# Patient Record
Sex: FEMALE | Race: WHITE | NOT HISPANIC OR LATINO | Employment: FULL TIME | URBAN - METROPOLITAN AREA
[De-identification: names, ages, dates, MRNs, and addresses within clinical notes are randomized per-mention and may not be internally consistent; named-entity substitution may affect disease eponyms.]

---

## 2017-01-03 ENCOUNTER — TELEPHONE (OUTPATIENT)
Dept: DERMATOLOGY | Facility: CLINIC | Age: 67
End: 2017-01-03

## 2017-01-03 NOTE — TELEPHONE ENCOUNTER
----- Message from Kimberly Krueger sent at 1/3/2017  8:59 AM CST -----  Please call patient in regards to a missed call [phone]

## 2017-01-03 NOTE — TELEPHONE ENCOUNTER
I spoke with patient this afternoon. She declines Mohs surgery with . I offered pt a consult with  she declines having Mohs surgery done. She will contact her PCP about having her cut it out for her

## 2017-01-17 ENCOUNTER — OFFICE VISIT (OUTPATIENT)
Dept: RHEUMATOLOGY | Facility: CLINIC | Age: 67
End: 2017-01-17
Payer: COMMERCIAL

## 2017-01-17 VITALS
BODY MASS INDEX: 32.13 KG/M2 | HEART RATE: 55 BPM | DIASTOLIC BLOOD PRESSURE: 89 MMHG | SYSTOLIC BLOOD PRESSURE: 179 MMHG | WEIGHT: 199.06 LBS

## 2017-01-17 DIAGNOSIS — M15.9 PRIMARY OSTEOARTHRITIS INVOLVING MULTIPLE JOINTS: ICD-10-CM

## 2017-01-17 DIAGNOSIS — I10 ESSENTIAL HYPERTENSION: ICD-10-CM

## 2017-01-17 DIAGNOSIS — L40.50 PSORIATIC ARTHRITIS: Primary | ICD-10-CM

## 2017-01-17 DIAGNOSIS — M79.7 FIBROMYALGIA: ICD-10-CM

## 2017-01-17 DIAGNOSIS — M81.0 OSTEOPOROSIS: ICD-10-CM

## 2017-01-17 PROCEDURE — 3079F DIAST BP 80-89 MM HG: CPT | Mod: S$GLB,,, | Performed by: INTERNAL MEDICINE

## 2017-01-17 PROCEDURE — 1159F MED LIST DOCD IN RCRD: CPT | Mod: S$GLB,,, | Performed by: INTERNAL MEDICINE

## 2017-01-17 PROCEDURE — 99215 OFFICE O/P EST HI 40 MIN: CPT | Mod: 25,S$GLB,, | Performed by: INTERNAL MEDICINE

## 2017-01-17 PROCEDURE — 99999 PR PBB SHADOW E&M-EST. PATIENT-LVL III: CPT | Mod: PBBFAC,,, | Performed by: INTERNAL MEDICINE

## 2017-01-17 PROCEDURE — 3077F SYST BP >= 140 MM HG: CPT | Mod: S$GLB,,, | Performed by: INTERNAL MEDICINE

## 2017-01-17 PROCEDURE — 96372 THER/PROPH/DIAG INJ SC/IM: CPT | Mod: S$GLB,,, | Performed by: INTERNAL MEDICINE

## 2017-01-17 RX ORDER — CLONIDINE HYDROCHLORIDE 0.1 MG/1
0.1 TABLET ORAL 2 TIMES DAILY
Qty: 30 TABLET | Refills: 0 | Status: SHIPPED | OUTPATIENT
Start: 2017-01-17 | End: 2017-06-07

## 2017-01-17 RX ORDER — ACETAMINOPHEN AND CODEINE PHOSPHATE 300; 30 MG/1; MG/1
TABLET ORAL
Qty: 120 TABLET | Refills: 3 | Status: SHIPPED | OUTPATIENT
Start: 2017-01-17 | End: 2017-06-07 | Stop reason: SDUPTHER

## 2017-01-17 RX ORDER — KETOROLAC TROMETHAMINE 30 MG/ML
60 INJECTION, SOLUTION INTRAMUSCULAR; INTRAVENOUS
Status: COMPLETED | OUTPATIENT
Start: 2017-01-17 | End: 2017-01-17

## 2017-01-17 RX ORDER — CYANOCOBALAMIN 1000 UG/ML
1000 INJECTION, SOLUTION INTRAMUSCULAR; SUBCUTANEOUS ONCE
Status: COMPLETED | OUTPATIENT
Start: 2017-01-17 | End: 2017-01-17

## 2017-01-17 RX ORDER — METHYLPREDNISOLONE ACETATE 80 MG/ML
160 INJECTION, SUSPENSION INTRA-ARTICULAR; INTRALESIONAL; INTRAMUSCULAR; SOFT TISSUE
Status: COMPLETED | OUTPATIENT
Start: 2017-01-17 | End: 2017-01-17

## 2017-01-17 RX ADMIN — CYANOCOBALAMIN 1000 MCG: 1000 INJECTION, SOLUTION INTRAMUSCULAR; SUBCUTANEOUS at 10:01

## 2017-01-17 RX ADMIN — METHYLPREDNISOLONE ACETATE 160 MG: 80 INJECTION, SUSPENSION INTRA-ARTICULAR; INTRALESIONAL; INTRAMUSCULAR; SOFT TISSUE at 10:01

## 2017-01-17 RX ADMIN — KETOROLAC TROMETHAMINE 60 MG: 30 INJECTION, SOLUTION INTRAMUSCULAR; INTRAVENOUS at 10:01

## 2017-01-17 NOTE — LETTER
January 17, 2017    Mary Reyes  [address] [address]             [place] [facility]  [address] [place] [address]  Phone: [phone]  Fax: [phone] Dear Sirs          Mrs Mary Reyes has the diagnosis of Rheumatoid arthritis and osteoarthritis,which causes pain stiffness and joint deformaties in her upper and lower extremities, cervical and lumbar spine proximal muscles and legs. She is unable to walk longer than one block without rest, unable to sit ,stand, bend, kneel. She is unable to lift more than five lbs. In my opinion which includes physical exams, xrays and laboratory data, due to the severity of her disease and the quality of her life as well as the inability to control her disease state I recommend that she continues her long term disability   If you have any questions or concerns, please don't hesitate to call.    Sincerely,        Ori Asher MD

## 2017-01-17 NOTE — PROGRESS NOTES
Subjective:       Patient ID: Mary Reyes is a 66 y.o. female.    Chief Complaint: Follow-up    HPI Comments: Follow up :PSA on MTX, she has a basal cell ca on back. She had elevated B/P due to white coat syndrome.Patient complains of arthralgias and myalgias for which has been present for a few years. Pain is located in multiple joints, both shoulder(s), both elbow(s), both wrist(s), both MCP(s): 1st, 2nd, 3rd, 4th and 5th, both PIP(s): 1st, 2nd, 3rd, 4th and 5th, both DIP(s): 1st and 2nd, both hip(s), both knee(s) and both MTP(s): 1st, 2nd, 3rd, 4th and 5th, is described as aching, pulsating, shooting and throbbing, and is constant, moderate .  Associated symptoms include: crepitation, decreased range of motion, edema, effusion, tenderness and warmth.                The disease course has been worsening. Compliance with total regimen is %.     She complains of joint swelling. Associated symptoms include fatigue.     Past treatments include methotrexate. The treatment provided moderate relief. Compliance with prior treatments has been good.     Review of Systems   Constitutional: Positive for activity change and fatigue. Negative for appetite change, chills, diaphoresis and unexpected weight change.   HENT: Negative for congestion, dental problem, ear discharge, ear pain, facial swelling, mouth sores, nosebleeds, postnasal drip, rhinorrhea, sinus pressure, sneezing, sore throat, tinnitus and voice change.    Eyes: Negative for photophobia, pain, discharge, redness and itching.   Respiratory: Negative for apnea, cough, chest tightness, shortness of breath and wheezing.    Cardiovascular: Positive for leg swelling. Negative for chest pain and palpitations.   Gastrointestinal: Negative for abdominal distention, abdominal pain, constipation, diarrhea, nausea and vomiting.   Endocrine: Negative for cold intolerance, heat intolerance, polydipsia and polyuria.   Genitourinary: Negative for decreased urine volume,  difficulty urinating, flank pain, frequency, hematuria and urgency.   Musculoskeletal: Positive for arthralgias, back pain, joint swelling, neck pain and neck stiffness. Negative for gait problem.   Skin: Positive for rash. Negative for pallor and wound.   Allergic/Immunologic: Positive for immunocompromised state.   Neurological: Positive for numbness. Negative for dizziness, tremors and weakness.   Hematological: Negative for adenopathy. Bruises/bleeds easily.   Psychiatric/Behavioral: Positive for sleep disturbance. The patient is not nervous/anxious.          Objective:     Visit Vitals    BP (!) 179/89 (BP Location: Right arm, Patient Position: Sitting, BP Method: Automatic)    Pulse (!) 55    Wt 90.3 kg (199 lb 1.2 oz)    BMI 32.13 kg/m2        Physical Exam   Nursing note and vitals reviewed.  Constitutional: She is oriented to person, place, and time and well-developed, well-nourished, and in no distress. She appears distressed.   HENT:   Head: Normocephalic and atraumatic.   Mouth/Throat: Oropharynx is clear and moist.   Eyes: EOM are normal. Pupils are equal, round, and reactive to light.   Neck: Neck supple. No thyromegaly present.   Cardiovascular: Normal rate, regular rhythm and normal heart sounds.  Exam reveals no gallop and no friction rub.    No murmur heard.  Pulmonary/Chest: Breath sounds normal. She has no wheezes. She has no rales. She exhibits no tenderness.   Abdominal: There is no tenderness. There is no rebound and no guarding.       Right Side Rheumatological Exam     Examination finds the 1st MCP, 2nd MCP, 3rd MCP, 4th MCP and 5th MCP normal.    The patient is tender to palpation of the shoulder, elbow, wrist, knee, 1st PIP, 1st MCP, 2nd PIP, 2nd MCP, 3rd PIP, 3rd MCP, 4th PIP, 4th MCP, 5th PIP and 5th MCP    She has swelling of the shoulder, elbow, knee, 1st PIP, 1st MCP, 2nd PIP, 2nd MCP, 3rd PIP, 3rd MCP, 4th PIP, 4th MCP, 5th PIP and 5th MCP    The patient has an enlarged wrist,  1st PIP, 2nd PIP, 3rd PIP, 4th PIP and 5th PIP    Shoulder Exam   Tenderness Location: acromion    Range of Motion   Active Abduction: abnormal   Adduction: abnormal  Sensation: decreased    Knee Exam   Tenderness Location: medial joint line and lateral joint line  Patellofemoral Crepitus: positive  Effusion: positive  Warmth: positive  Sensation: decreased    Hip Exam   Tenderness Location: posterior    Range of Motion   Extension: abnormal   Flexion: abnormal   Abduction: abnormal   Adduction: abnormal   Sensation: decreased    Elbow/Wrist Exam   Tenderness Location: no tenderness  Sensation: normal    Foot Exam   Right foot exam exhibits signs of inflamed dorsum  Right foot exam exhibits signs of no podagra, no tophus and no plantar fasciitis  Ankle Swelling: positive  Ankle Crepitus: positive  Foot Swelling: postitive  Foot Crepitus: positive    Muscle Strength (0-5 scale):  : 4/5     Left Side Rheumatological Exam     Examination finds the knee, 1st MCP, 2nd MCP, 3rd MCP, 4th MCP and 5th MCP normal.    The patient is tender to palpation of the shoulder, elbow, wrist, knee, 1st PIP, 1st MCP, 2nd PIP, 2nd MCP, 3rd PIP, 3rd MCP, 4th PIP, 4th MCP, 5th PIP and 5th MCP.    She has swelling of the shoulder, elbow, 1st PIP, 1st MCP, 2nd PIP, 2nd MCP, 3rd PIP, 3rd MCP, 4th PIP, 4th MCP, 5th PIP and 5th MCP    The patient has an enlarged shoulder, wrist, 1st PIP, 2nd PIP, 3rd PIP, 4th PIP and 5th PIP.    Shoulder Exam   Tenderness Location: acromion    Range of Motion   Active Abduction: abnormal   Sensation: decreased    Knee Exam   Tenderness Location: lateral joint line and medial joint line    Patellofemoral Crepitus: positive  Effusion: positive  Warmth: positive  Sensation: decreased    Hip Exam   Tenderness Location: posterior    Range of Motion   Extension: abnormal   Flexion: abnormal   Abduction: abnormal   Adduction: abnormal   Sensation: decreased    Elbow/Wrist Exam   Tenderness Location: lateral  epicondyle and medial epicondyle  Sensation: normal    Foot Exam   Left foot exam exhibits signs of inflamed dorsum  Left foot exam exhibits signs of no podagra and no plantar fasciitis  Ankle Swelling: positive  Foot Swelling: positive  Foot Crepitus: positive    Muscle Strength (0-5 scale):  :  4/5   Quadriceps:  2       Back/Neck Exam   General Inspection   Gait: L knee brace.       Tenderness Right paramedian tenderness of the Lower C-Spine, Lower L-Spine and SI Joint.Left paramedian tenderness of the Upper C-Spine, Lower L-Spine and SI Joint.      Comments:  15 out of 18 tender points    Lymphadenopathy:     She has no cervical adenopathy.   Neurological: She is alert and oriented to person, place, and time. Gait normal.   Reflex Scores:       Patellar reflexes are 3+ on the right side and 3+ on the left side.  Skin: Rash noted. No erythema. No pallor.     B feet, knee knees , elbows   Psychiatric: Mood and affect normal.   Musculoskeletal: She exhibits edema and tenderness.              Assessment:         Encounter Diagnoses   Name Primary?    Psoriatic arthritis Yes    Essential hypertension     Primary osteoarthritis involving multiple joints     Fibromyalgia     Osteoporosis          Plan:     Psoriatic arthritis  -     cyanocobalamin injection 1,000 mcg; Inject 1 mL (1,000 mcg total) into the muscle once.  -     ketorolac injection 60 mg; Inject 2 mLs (60 mg total) into the muscle one time.  -     methylPREDNISolone acetate injection 160 mg; Inject 2 mLs (160 mg total) into the muscle one time.  -     cloNIDine (CATAPRES) 0.1 MG tablet; Take 1 tablet (0.1 mg total) by mouth 2 (two) times daily.  Dispense: 30 tablet; Refill: 0  -     Comprehensive metabolic panel; Future; Expected date: 1/17/17  -     CBC auto differential; Future; Expected date: 1/17/17  -     C-reactive protein; Future; Expected date: 1/17/17  -     Sedimentation rate, manual; Future; Expected date: 1/17/17  -      acetaminophen-codeine 300-30mg (TYLENOL #3) 300-30 mg Tab; 1 tab po q 6 hrs prn pain , ra flare, psoriatic joint pain  Dispense: 120 tablet; Refill: 3    Essential hypertension  -     cyanocobalamin injection 1,000 mcg; Inject 1 mL (1,000 mcg total) into the muscle once.  -     ketorolac injection 60 mg; Inject 2 mLs (60 mg total) into the muscle one time.  -     methylPREDNISolone acetate injection 160 mg; Inject 2 mLs (160 mg total) into the muscle one time.  -     cloNIDine (CATAPRES) 0.1 MG tablet; Take 1 tablet (0.1 mg total) by mouth 2 (two) times daily.  Dispense: 30 tablet; Refill: 0  -     Comprehensive metabolic panel; Future; Expected date: 1/17/17  -     CBC auto differential; Future; Expected date: 1/17/17  -     C-reactive protein; Future; Expected date: 1/17/17  -     Sedimentation rate, manual; Future; Expected date: 1/17/17  -     acetaminophen-codeine 300-30mg (TYLENOL #3) 300-30 mg Tab; 1 tab po q 6 hrs prn pain , ra flare, psoriatic joint pain  Dispense: 120 tablet; Refill: 3    Primary osteoarthritis involving multiple joints  -     acetaminophen-codeine 300-30mg (TYLENOL #3) 300-30 mg Tab; 1 tab po q 6 hrs prn pain , ra flare, psoriatic joint pain  Dispense: 120 tablet; Refill: 3    Fibromyalgia  -     acetaminophen-codeine 300-30mg (TYLENOL #3) 300-30 mg Tab; 1 tab po q 6 hrs prn pain , ra flare, psoriatic joint pain  Dispense: 120 tablet; Refill: 3    Osteoporosis  -     acetaminophen-codeine 300-30mg (TYLENOL #3) 300-30 mg Tab; 1 tab po q 6 hrs prn pain , ra flare, psoriatic joint pain  Dispense: 120 tablet; Refill: 3

## 2017-01-17 NOTE — PROGRESS NOTES
Administered 1 cc ( 1000 mcg/ml ) of b 12 to the right upper outer gluteal. Informed of s/s to report verbalized understanding. No adverse reactions noted.    Lot # [id number]  Expiration may 18    Administered 2 cc ( 80 mg/ml ) of depomedrol to the right upper outer gluteal. Informed of s/s to report verbalized understanding. No adverse reactions noted.    Lot # [id number]  Expiration 10/2017    Administered 2 cc ( 30 mg/ml ) of toradol to the left upper outer gluteal. Informed of s/s to report verbalized understanding. No adverse reactions noted.    Lot # [id number]  Expiration 1 may 2018

## 2017-01-17 NOTE — MR AVS SNAPSHOT
[place] [facility]  [address]  [facility] [address]  Phone: [phone]  Fax: [phone]                  Mary Reyes   2017 9:00 AM   Office Visit    Description:  Female : 1950   Provider:  Ori Asher MD   Department:  [place] [facility]           Reason for Visit     Follow-up           Diagnoses this Visit        Comments    Psoriatic arthritis    -  Primary     Essential hypertension         Primary osteoarthritis involving multiple joints         Fibromyalgia         Osteoporosis                To Do List           Goals (5 Years of Data)     None      Follow-Up and Disposition     Return in about 4 months (around 2017).       These Medications        Disp Refills Start End    cloNIDine (CATAPRES) 0.1 MG tablet 30 tablet 0 2017    Take 1 tablet (0.1 mg total) by mouth 2 (two) times daily. - Oral    Pharmacy: [facility] [address] [place] [address] AT [facility] Ph #: [phone]       acetaminophen-codeine 300-30mg (TYLENOL #3) 300-30 mg Tab 120 tablet 3 2017     1 tab po q 6 hrs prn pain , ra flare, psoriatic joint pain    Pharmacy: [facility] [address] [place] [address] AT [facility] Ph #: [phone]         [facility] On Call     [facility] On Call Nurse Care Line -  Assistance  Registered nurses in the [facility] provide clinical advisement, health education, appointment booking, and other advisory services.  Call for this free service at [phone].             Medications           Message regarding Medications     Verify the changes and/or additions to your medication regime listed below are the same as discussed with your clinician today.  If any of these changes or additions are incorrect, please notify your healthcare provider.        START taking these NEW medications        Refills    cloNIDine (CATAPRES)  0.1 MG tablet 0    Sig: Take 1 tablet (0.1 mg total) by mouth 2 (two) times daily.    Class: Normal    Route: Oral      These medications were administered today        Dose Freq    cyanocobalamin injection 1,000 mcg 1,000 mcg Once    Sig: Inject 1 mL (1,000 mcg total) into the muscle once.    Class: Normal    Route: Intramuscular    ketorolac injection 60 mg 60 mg Clinic/HOD 1 time    Sig: Inject 2 mLs (60 mg total) into the muscle one time.    Class: Normal    Route: Intramuscular    methylPREDNISolone acetate injection 160 mg 160 mg Clinic/HOD 1 time    Sig: Inject 2 mLs (160 mg total) into the muscle one time.    Class: Normal    Route: Intramuscular           Verify that the below list of medications is an accurate representation of the medications you are currently taking.  If none reported, the list may be blank. If incorrect, please contact your healthcare provider. Carry this list with you in case of emergency.           Current Medications     acetaminophen-codeine 300-30mg (TYLENOL #3) 300-30 mg Tab 1 tab po q 6 hrs prn pain , ra flare, psoriatic joint pain    amitriptyline (ELAVIL) 10 MG tablet Take 2 tablets (20 mg total) by mouth nightly as needed for Insomnia.    aspirin 81 MG Chew Take 81 mg by mouth once daily.    atenolol (TENORMIN) 50 MG tablet Take 1 tablet (50 mg total) by mouth 2 (two) times daily.    clobetasol 0.05% (TEMOVATE) 0.05 % Oint Apply topically 2 (two) times daily.    econazole nitrate 1 % cream Apply to foot twice daily x 1 month    ergocalciferol (ERGOCALCIFEROL) 50,000 unit Cap Take 1 capsule (50,000 Units total) by mouth every 7 days.    fluconazole (DIFLUCAN) 200 MG Tab 1 po qweek x 2 months    fluorouracil (EFUDEX) 5 % cream AAA nose twice daily x 2 weeks, stop if severe irritation develops    folic acid (FOLVITE) 1 MG tablet Take 1 tablet (1,000 mcg total) by mouth once daily.    furosemide (LASIX) 20 MG tablet Take 1 tablet (20 mg total) by mouth once daily.    ibuprofen  (ADVIL,MOTRIN) 400 MG tablet Take 400 mg by mouth every 6 (six) hours as needed for Other.    methotrexate 2.5 MG Tab Take 8 tablets (20 mg total) by mouth every 7 days.    omeprazole (PRILOSEC) 40 MG capsule Take 1 capsule (40 mg total) by mouth once daily.    predniSONE (DELTASONE) 5 MG tablet 3 tabs po qam prn flare    PRENATAL VIT/IRON FUMARATE/FA (PRENATAL 1+1 ORAL) Take 1 tablet by mouth.    simvastatin (ZOCOR) 40 MG tablet Take 1 tablet (40 mg total) by mouth every evening.    cloNIDine (CATAPRES) 0.1 MG tablet Take 1 tablet (0.1 mg total) by mouth 2 (two) times daily.           Clinical Reference Information           Vital Signs - Last Recorded  Most recent update: 1/17/2017  9:19 AM by Trixie Romano LPN    BP Pulse Wt BMI       (!) 179/89 (BP Location: Right arm, Patient Position: Sitting, BP Method: Automatic) (!) 55 90.3 kg (199 lb 1.2 oz) 32.13 kg/m2       Blood Pressure          Most Recent Value    BP  (!)  179/89      Allergies as of 1/17/2017     Effexor [Venlafaxine]      Immunizations Administered on Date of Encounter - 1/17/2017     None      Orders Placed During Today's Visit      Normal Orders This Visit    C-reactive protein     CBC auto differential     Comprehensive metabolic panel     Sedimentation rate, manual     Future Labs/Procedures Expected by Expires    C-reactive protein  1/17/2017 3/18/2018    CBC auto differential  1/17/2017 3/18/2018    Comprehensive metabolic panel  1/17/2017 3/18/2018    Sedimentation rate, manual  1/17/2017 3/18/2018      Administrations This Visit     cyanocobalamin injection 1,000 mcg     Admin Date Action Dose Route Administered By             01/17/2017 Given 1000 mcg Intramuscular Trixie Romano LPN                    ketorolac injection 60 mg     Admin Date Action Dose Route Administered By             01/17/2017 Given 60 mg Intramuscular Triixe Romano LPN                    methylPREDNISolone acetate injection 160 mg     Admin Date  Action Dose Route Administered By             01/17/2017 Given 160 mg Intramuscular Trixie Romano LPN

## 2017-02-16 ENCOUNTER — PATIENT MESSAGE (OUTPATIENT)
Dept: RHEUMATOLOGY | Facility: CLINIC | Age: 67
End: 2017-02-16

## 2017-02-20 ENCOUNTER — PATIENT MESSAGE (OUTPATIENT)
Dept: RHEUMATOLOGY | Facility: CLINIC | Age: 67
End: 2017-02-20

## 2017-02-20 DIAGNOSIS — Z79.631 METHOTREXATE, LONG TERM, CURRENT USE: ICD-10-CM

## 2017-02-20 DIAGNOSIS — M15.9 PRIMARY OSTEOARTHRITIS INVOLVING MULTIPLE JOINTS: ICD-10-CM

## 2017-02-20 DIAGNOSIS — M79.7 FIBROMYALGIA: ICD-10-CM

## 2017-02-20 RX ORDER — METHOTREXATE 2.5 MG/1
TABLET ORAL
Qty: 96 TABLET | Refills: 0 | Status: SHIPPED | OUTPATIENT
Start: 2017-02-20 | End: 2017-06-07 | Stop reason: SDUPTHER

## 2017-02-27 ENCOUNTER — PATIENT MESSAGE (OUTPATIENT)
Dept: RHEUMATOLOGY | Facility: CLINIC | Age: 67
End: 2017-02-27

## 2017-03-03 ENCOUNTER — PATIENT MESSAGE (OUTPATIENT)
Dept: RHEUMATOLOGY | Facility: CLINIC | Age: 67
End: 2017-03-03

## 2017-03-08 ENCOUNTER — TELEPHONE (OUTPATIENT)
Dept: RHEUMATOLOGY | Facility: CLINIC | Age: 67
End: 2017-03-08

## 2017-03-08 NOTE — TELEPHONE ENCOUNTER
Returned patient's call. No answer. lvm for patient to return clinic's call regarding jury duty letter.

## 2017-03-09 ENCOUNTER — PATIENT MESSAGE (OUTPATIENT)
Dept: RHEUMATOLOGY | Facility: CLINIC | Age: 67
End: 2017-03-09

## 2017-05-16 ENCOUNTER — PATIENT MESSAGE (OUTPATIENT)
Dept: DERMATOLOGY | Facility: CLINIC | Age: 67
End: 2017-05-16

## 2017-06-07 ENCOUNTER — OFFICE VISIT (OUTPATIENT)
Dept: RHEUMATOLOGY | Facility: CLINIC | Age: 67
End: 2017-06-07
Payer: COMMERCIAL

## 2017-06-07 VITALS
BODY MASS INDEX: 32.01 KG/M2 | HEART RATE: 68 BPM | SYSTOLIC BLOOD PRESSURE: 155 MMHG | DIASTOLIC BLOOD PRESSURE: 91 MMHG | WEIGHT: 198.31 LBS

## 2017-06-07 DIAGNOSIS — L40.50 PSORIATIC ARTHRITIS: ICD-10-CM

## 2017-06-07 DIAGNOSIS — M15.9 PRIMARY OSTEOARTHRITIS INVOLVING MULTIPLE JOINTS: ICD-10-CM

## 2017-06-07 DIAGNOSIS — M81.0 OSTEOPOROSIS, UNSPECIFIED OSTEOPOROSIS TYPE, UNSPECIFIED PATHOLOGICAL FRACTURE PRESENCE: ICD-10-CM

## 2017-06-07 DIAGNOSIS — M79.7 FIBROMYALGIA: ICD-10-CM

## 2017-06-07 DIAGNOSIS — Z79.1 NSAID LONG-TERM USE: ICD-10-CM

## 2017-06-07 DIAGNOSIS — I10 ESSENTIAL HYPERTENSION: ICD-10-CM

## 2017-06-07 DIAGNOSIS — Z79.631 METHOTREXATE, LONG TERM, CURRENT USE: ICD-10-CM

## 2017-06-07 PROCEDURE — 99999 PR PBB SHADOW E&M-EST. PATIENT-LVL III: CPT | Mod: PBBFAC,,, | Performed by: INTERNAL MEDICINE

## 2017-06-07 PROCEDURE — 99215 OFFICE O/P EST HI 40 MIN: CPT | Mod: 25,S$GLB,, | Performed by: INTERNAL MEDICINE

## 2017-06-07 PROCEDURE — 96372 THER/PROPH/DIAG INJ SC/IM: CPT | Mod: S$GLB,,, | Performed by: INTERNAL MEDICINE

## 2017-06-07 RX ORDER — KETOROLAC TROMETHAMINE 30 MG/ML
60 INJECTION, SOLUTION INTRAMUSCULAR; INTRAVENOUS
Status: COMPLETED | OUTPATIENT
Start: 2017-06-07 | End: 2017-06-07

## 2017-06-07 RX ORDER — FLUCONAZOLE 200 MG/1
TABLET ORAL
Qty: 4 TABLET | Refills: 1 | Status: SHIPPED | OUTPATIENT
Start: 2017-06-07 | End: 2017-12-05

## 2017-06-07 RX ORDER — PREDNISONE 5 MG/1
TABLET ORAL
Qty: 90 TABLET | Refills: 3 | Status: SHIPPED | OUTPATIENT
Start: 2017-06-07 | End: 2017-11-07 | Stop reason: SDUPTHER

## 2017-06-07 RX ORDER — METHOTREXATE 2.5 MG/1
20 TABLET ORAL
Qty: 96 TABLET | Refills: 1 | Status: SHIPPED | OUTPATIENT
Start: 2017-06-07 | End: 2017-11-01 | Stop reason: SDUPTHER

## 2017-06-07 RX ORDER — ACETAMINOPHEN AND CODEINE PHOSPHATE 300; 30 MG/1; MG/1
TABLET ORAL
Qty: 120 TABLET | Refills: 3 | Status: SHIPPED | OUTPATIENT
Start: 2017-06-07 | End: 2017-11-07 | Stop reason: SDUPTHER

## 2017-06-07 RX ORDER — ATENOLOL 50 MG/1
50 TABLET ORAL 2 TIMES DAILY
Qty: 180 TABLET | Refills: 2 | Status: SHIPPED | OUTPATIENT
Start: 2017-06-07 | End: 2017-11-07 | Stop reason: SDUPTHER

## 2017-06-07 RX ORDER — FOLIC ACID 1 MG/1
1000 TABLET ORAL DAILY
Qty: 90 TABLET | Refills: 3 | Status: SHIPPED | OUTPATIENT
Start: 2017-06-07 | End: 2017-11-07 | Stop reason: SDUPTHER

## 2017-06-07 RX ORDER — ERGOCALCIFEROL 1.25 MG/1
50000 CAPSULE ORAL
Qty: 4 CAPSULE | Refills: 5 | Status: SHIPPED | OUTPATIENT
Start: 2017-06-07 | End: 2018-09-10

## 2017-06-07 RX ORDER — METHYLPREDNISOLONE ACETATE 80 MG/ML
160 INJECTION, SUSPENSION INTRA-ARTICULAR; INTRALESIONAL; INTRAMUSCULAR; SOFT TISSUE
Status: COMPLETED | OUTPATIENT
Start: 2017-06-07 | End: 2017-06-07

## 2017-06-07 RX ORDER — CYANOCOBALAMIN 1000 UG/ML
1000 INJECTION, SOLUTION INTRAMUSCULAR; SUBCUTANEOUS
Status: COMPLETED | OUTPATIENT
Start: 2017-06-07 | End: 2017-06-07

## 2017-06-07 RX ORDER — OMEPRAZOLE 40 MG/1
40 CAPSULE, DELAYED RELEASE ORAL DAILY
Qty: 90 CAPSULE | Refills: 3 | Status: SHIPPED | OUTPATIENT
Start: 2017-06-07 | End: 2017-11-07 | Stop reason: SDUPTHER

## 2017-06-07 RX ADMIN — CYANOCOBALAMIN 1000 MCG: 1000 INJECTION, SOLUTION INTRAMUSCULAR; SUBCUTANEOUS at 12:06

## 2017-06-07 RX ADMIN — KETOROLAC TROMETHAMINE 60 MG: 30 INJECTION, SOLUTION INTRAMUSCULAR; INTRAVENOUS at 12:06

## 2017-06-07 RX ADMIN — METHYLPREDNISOLONE ACETATE 160 MG: 80 INJECTION, SUSPENSION INTRA-ARTICULAR; INTRALESIONAL; INTRAMUSCULAR; SOFT TISSUE at 12:06

## 2017-06-07 ASSESSMENT — ROUTINE ASSESSMENT OF PATIENT INDEX DATA (RAPID3)
PSYCHOLOGICAL DISTRESS SCORE: 3.3
PATIENT GLOBAL ASSESSMENT SCORE: 3
MDHAQ FUNCTION SCORE: 1.6
FATIGUE SCORE: 3
WHEN YOU AWAKENED IN THE MORNING OVER THE LAST WEEK, PLEASE INDICATE THE AMOUNT OF TIME IT TAKES UNTIL YOU ARE AS LIMBER AS YOU WILL BE FOR THE DAY: ONE HOUR AFTER WAKING
PAIN SCORE: 10
TOTAL RAPID3 SCORE: 6.11
AM STIFFNESS SCORE: 1, YES

## 2017-06-07 NOTE — PROGRESS NOTES
Administered 1 cc ( 1000 mcg/ml ) of b12 to the right upper outer gluteal. Informed of s/s to report verbalized understanding. No adverse reactions noted.    Lot # [id number]  Expiration aug 18    Administered 2 cc ( 30 mg/ml ) of toradol to the right upper outer gluteal. Informed of s/s to report verbalized understanding. No adverse reactions noted.    Lot # [id number]  Expiration 1 oct 2018    Administered 2 cc ( 80 mg/ml ) of depomedrol to the right upper outer gluteal. Informed of s/s to report verbalized understanding. No adverse reactions noted.    Lot #[id number]  Expiration 02/2018

## 2017-06-07 NOTE — PROGRESS NOTES
Subjective:       Patient ID: Mary Reyes is a 66 y.o. female.    Chief Complaint: Follow-up    Follow up :PSA on MTX, continue with her methotrexate she had her surgery and healing well no inflammation. Patient complains of arthralgias and myalgias for which has been present for a few years. Pain is located in multiple joints, both shoulder(s), both elbow(s), both wrist(s), both MCP(s): 1st, 2nd, 3rd, 4th and 5th, both PIP(s): 1st, 2nd, 3rd, 4th and 5th, both DIP(s): 1st and 2nd, both hip(s), both knee(s) and both MTP(s): 1st, 2nd, 3rd, 4th and 5th, is described as aching, pulsating, shooting and throbbing, and is constant, moderate .  Associated symptoms include: crepitation, decreased range of motion, edema, effusion, tenderness and warmth.                  The disease course has been worsening. Compliance with total regimen is %.     She complains of joint swelling. Associated symptoms include fatigue.     Past treatments include methotrexate. The treatment provided moderate relief. Compliance with prior treatments has been good.     Review of Systems   Constitutional: Positive for activity change and fatigue. Negative for appetite change, chills, diaphoresis and unexpected weight change.   HENT: Negative for congestion, dental problem, ear discharge, ear pain, facial swelling, mouth sores, nosebleeds, postnasal drip, rhinorrhea, sinus pressure, sneezing, sore throat, tinnitus and voice change.    Eyes: Negative for photophobia, pain, discharge, redness and itching.   Respiratory: Negative for apnea, cough, chest tightness, shortness of breath and wheezing.    Cardiovascular: Positive for leg swelling. Negative for chest pain and palpitations.   Gastrointestinal: Negative for abdominal distention, abdominal pain, constipation, diarrhea, nausea and vomiting.   Endocrine: Negative for cold intolerance, heat intolerance, polydipsia and polyuria.   Genitourinary: Negative for decreased urine volume,  difficulty urinating, flank pain, frequency, hematuria and urgency.   Musculoskeletal: Positive for arthralgias, back pain, joint swelling, neck pain and neck stiffness. Negative for gait problem.   Skin: Positive for rash. Negative for pallor and wound.   Allergic/Immunologic: Positive for immunocompromised state.   Neurological: Positive for numbness. Negative for dizziness, tremors and weakness.   Hematological: Negative for adenopathy. Bruises/bleeds easily.   Psychiatric/Behavioral: Positive for sleep disturbance. The patient is not nervous/anxious.          Objective:     BP (!) 155/91 (BP Location: Right arm, Patient Position: Sitting, BP Method: Automatic)   Pulse 68   Wt 89.9 kg (198 lb 4.8 oz)   BMI 32.01 kg/m²      Physical Exam   Nursing note and vitals reviewed.  Constitutional: She is oriented to person, place, and time and well-developed, well-nourished, and in no distress. She appears distressed.   HENT:   Head: Normocephalic and atraumatic.   Mouth/Throat: Oropharynx is clear and moist.   Eyes: EOM are normal. Pupils are equal, round, and reactive to light.   Neck: Neck supple. No thyromegaly present.   Cardiovascular: Normal rate, regular rhythm and normal heart sounds.  Exam reveals no gallop and no friction rub.    No murmur heard.  Pulmonary/Chest: Breath sounds normal. She has no wheezes. She has no rales. She exhibits no tenderness.   Abdominal: There is no tenderness. There is no rebound and no guarding.       Right Side Rheumatological Exam     Examination finds the 1st MCP, 2nd MCP, 3rd MCP, 4th MCP and 5th MCP normal.    The patient is tender to palpation of the shoulder, elbow, wrist, knee, 1st PIP, 1st MCP, 2nd PIP, 2nd MCP, 3rd PIP, 3rd MCP, 4th PIP, 4th MCP, 5th PIP and 5th MCP    She has swelling of the shoulder, elbow, knee, 1st PIP, 1st MCP, 2nd PIP, 2nd MCP, 3rd PIP, 3rd MCP, 4th PIP, 4th MCP, 5th PIP and 5th MCP    The patient has an enlarged wrist, 1st PIP, 2nd PIP, 3rd PIP,  4th PIP and 5th PIP    Shoulder Exam   Tenderness Location: acromion    Range of Motion   Active Abduction: abnormal   Adduction: abnormal  Sensation: decreased    Knee Exam   Tenderness Location: medial joint line and lateral joint line  Patellofemoral Crepitus: positive  Effusion: positive  Warmth: positive  Sensation: decreased    Hip Exam   Tenderness Location: posterior    Range of Motion   Extension: abnormal   Flexion: abnormal   Abduction: abnormal   Adduction: abnormal   Sensation: decreased    Elbow/Wrist Exam   Tenderness Location: no tenderness  Sensation: normal    Foot Exam   Right foot exam exhibits signs of inflamed dorsum  Right foot exam exhibits signs of no podagra, no tophus and no plantar fasciitis  Ankle Swelling: positive  Ankle Crepitus: positive  Foot Swelling: postitive  Foot Crepitus: positive    Muscle Strength (0-5 scale):  : 4/5     Left Side Rheumatological Exam     Examination finds the knee, 1st MCP, 2nd MCP, 3rd MCP, 4th MCP and 5th MCP normal.    The patient is tender to palpation of the shoulder, elbow, wrist, knee, 1st PIP, 1st MCP, 2nd PIP, 2nd MCP, 3rd PIP, 3rd MCP, 4th PIP, 4th MCP, 5th PIP and 5th MCP.    She has swelling of the shoulder, elbow, 1st PIP, 1st MCP, 2nd PIP, 2nd MCP, 3rd PIP, 3rd MCP, 4th PIP, 4th MCP, 5th PIP and 5th MCP    The patient has an enlarged shoulder, wrist, 1st PIP, 2nd PIP, 3rd PIP, 4th PIP and 5th PIP.    Shoulder Exam   Tenderness Location: acromion    Range of Motion   Active Abduction: abnormal   Sensation: decreased    Knee Exam   Tenderness Location: lateral joint line and medial joint line    Patellofemoral Crepitus: positive  Effusion: positive  Warmth: positive  Sensation: decreased    Hip Exam   Tenderness Location: posterior    Range of Motion   Extension: abnormal   Flexion: abnormal   Abduction: abnormal   Adduction: abnormal   Sensation: decreased    Elbow/Wrist Exam   Tenderness Location: lateral epicondyle and medial  epicondyle  Sensation: normal    Foot Exam   Left foot exam exhibits signs of inflamed dorsum  Left foot exam exhibits signs of no podagra and no plantar fasciitis  Ankle Swelling: positive  Foot Swelling: positive  Foot Crepitus: positive    Muscle Strength (0-5 scale):  :  4/5   Quadriceps:  2       Back/Neck Exam   General Inspection   Gait: L knee brace.       Tenderness Right paramedian tenderness of the Lower C-Spine, Lower L-Spine and SI Joint.Left paramedian tenderness of the Upper C-Spine, Lower L-Spine and SI Joint.      Comments:  15 out of 18 tender points    Lymphadenopathy:     She has no cervical adenopathy.   Neurological: She is alert and oriented to person, place, and time. Gait normal.   Reflex Scores:       Patellar reflexes are 3+ on the right side and 3+ on the left side.  Skin: Rash noted. No erythema. No pallor.     B feet, knee knees , elbows   Psychiatric: Mood and affect normal.   Musculoskeletal: She exhibits edema, tenderness and deformity.              Assessment:         Encounter Diagnoses   Name Primary?    Primary osteoarthritis involving multiple joints     Psoriatic arthritis     Fibromyalgia     Osteoporosis, unspecified osteoporosis type, unspecified pathological fracture presence     Essential hypertension     Onychomycosis     Tinea pedis of right foot          Plan:     Primary osteoarthritis involving multiple joints  -     acetaminophen-codeine 300-30mg (TYLENOL #3) 300-30 mg Tab; 1 tab po q 6 hrs prn pain , ra flare, psoriatic joint pain  Dispense: 120 tablet; Refill: 3  -     fluconazole (DIFLUCAN) 200 MG Tab; Take one tablet daily  Dispense: 4 tablet; Refill: 1  -     methylPREDNISolone acetate injection 160 mg; Inject 2 mLs (160 mg total) into the muscle one time.  -     ketorolac injection 60 mg; Inject 2 mLs (60 mg total) into the muscle one time.  -     cyanocobalamin injection 1,000 mcg; Inject 1 mL (1,000 mcg total) into the muscle one time.  -      Comprehensive metabolic panel; Future; Expected date: 06/07/2017  -     C-reactive protein; Future; Expected date: 06/07/2017  -     TSH; Future; Expected date: 06/07/2017  -     T4, free; Future; Expected date: 06/07/2017  -     T3, free; Future; Expected date: 06/07/2017  -     Sedimentation rate, manual; Future; Expected date: 06/07/2017  -     Rheumatoid factor; Future; Expected date: 06/07/2017  -     CBC auto differential; Future; Expected date: 06/07/2017  -     Urinalysis; Future; Expected date: 06/07/2017  -     Cyclic citrul peptide antibody, IgG; Future; Expected date: 06/07/2017  -     methotrexate 2.5 MG Tab; Take 8 tablets (20 mg total) by mouth every 7 days.  Dispense: 96 tablet; Refill: 1  -     predniSONE (DELTASONE) 5 MG tablet; 3 tabs po qam prn flare  Dispense: 90 tablet; Refill: 3  -     folic acid (FOLVITE) 1 MG tablet; Take 1 tablet (1,000 mcg total) by mouth once daily.  Dispense: 90 tablet; Refill: 3  -     omeprazole (PRILOSEC) 40 MG capsule; Take 1 capsule (40 mg total) by mouth once daily.  Dispense: 90 capsule; Refill: 3  -     atenolol (TENORMIN) 50 MG tablet; Take 1 tablet (50 mg total) by mouth 2 (two) times daily.  Dispense: 180 tablet; Refill: 2    Psoriatic arthritis  -     acetaminophen-codeine 300-30mg (TYLENOL #3) 300-30 mg Tab; 1 tab po q 6 hrs prn pain , ra flare, psoriatic joint pain  Dispense: 120 tablet; Refill: 3  -     fluconazole (DIFLUCAN) 200 MG Tab; Take one tablet daily  Dispense: 4 tablet; Refill: 1  -     methylPREDNISolone acetate injection 160 mg; Inject 2 mLs (160 mg total) into the muscle one time.  -     ketorolac injection 60 mg; Inject 2 mLs (60 mg total) into the muscle one time.  -     cyanocobalamin injection 1,000 mcg; Inject 1 mL (1,000 mcg total) into the muscle one time.  -     Comprehensive metabolic panel; Future; Expected date: 06/07/2017  -     C-reactive protein; Future; Expected date: 06/07/2017  -     TSH; Future; Expected date: 06/07/2017  -      T4, free; Future; Expected date: 06/07/2017  -     T3, free; Future; Expected date: 06/07/2017  -     Sedimentation rate, manual; Future; Expected date: 06/07/2017  -     Rheumatoid factor; Future; Expected date: 06/07/2017  -     CBC auto differential; Future; Expected date: 06/07/2017  -     Urinalysis; Future; Expected date: 06/07/2017  -     Cyclic citrul peptide antibody, IgG; Future; Expected date: 06/07/2017  -     methotrexate 2.5 MG Tab; Take 8 tablets (20 mg total) by mouth every 7 days.  Dispense: 96 tablet; Refill: 1  -     predniSONE (DELTASONE) 5 MG tablet; 3 tabs po qam prn flare  Dispense: 90 tablet; Refill: 3  -     folic acid (FOLVITE) 1 MG tablet; Take 1 tablet (1,000 mcg total) by mouth once daily.  Dispense: 90 tablet; Refill: 3  -     omeprazole (PRILOSEC) 40 MG capsule; Take 1 capsule (40 mg total) by mouth once daily.  Dispense: 90 capsule; Refill: 3  -     atenolol (TENORMIN) 50 MG tablet; Take 1 tablet (50 mg total) by mouth 2 (two) times daily.  Dispense: 180 tablet; Refill: 2    Fibromyalgia  -     acetaminophen-codeine 300-30mg (TYLENOL #3) 300-30 mg Tab; 1 tab po q 6 hrs prn pain , ra flare, psoriatic joint pain  Dispense: 120 tablet; Refill: 3  -     fluconazole (DIFLUCAN) 200 MG Tab; Take one tablet daily  Dispense: 4 tablet; Refill: 1  -     methylPREDNISolone acetate injection 160 mg; Inject 2 mLs (160 mg total) into the muscle one time.  -     ketorolac injection 60 mg; Inject 2 mLs (60 mg total) into the muscle one time.  -     cyanocobalamin injection 1,000 mcg; Inject 1 mL (1,000 mcg total) into the muscle one time.  -     Comprehensive metabolic panel; Future; Expected date: 06/07/2017  -     C-reactive protein; Future; Expected date: 06/07/2017  -     TSH; Future; Expected date: 06/07/2017  -     T4, free; Future; Expected date: 06/07/2017  -     T3, free; Future; Expected date: 06/07/2017  -     Sedimentation rate, manual; Future; Expected date: 06/07/2017  -     Rheumatoid  factor; Future; Expected date: 06/07/2017  -     CBC auto differential; Future; Expected date: 06/07/2017  -     Urinalysis; Future; Expected date: 06/07/2017  -     Cyclic citrul peptide antibody, IgG; Future; Expected date: 06/07/2017  -     methotrexate 2.5 MG Tab; Take 8 tablets (20 mg total) by mouth every 7 days.  Dispense: 96 tablet; Refill: 1  -     predniSONE (DELTASONE) 5 MG tablet; 3 tabs po qam prn flare  Dispense: 90 tablet; Refill: 3  -     folic acid (FOLVITE) 1 MG tablet; Take 1 tablet (1,000 mcg total) by mouth once daily.  Dispense: 90 tablet; Refill: 3  -     omeprazole (PRILOSEC) 40 MG capsule; Take 1 capsule (40 mg total) by mouth once daily.  Dispense: 90 capsule; Refill: 3  -     atenolol (TENORMIN) 50 MG tablet; Take 1 tablet (50 mg total) by mouth 2 (two) times daily.  Dispense: 180 tablet; Refill: 2    Osteoporosis, unspecified osteoporosis type, unspecified pathological fracture presence  -     acetaminophen-codeine 300-30mg (TYLENOL #3) 300-30 mg Tab; 1 tab po q 6 hrs prn pain , ra flare, psoriatic joint pain  Dispense: 120 tablet; Refill: 3  -     fluconazole (DIFLUCAN) 200 MG Tab; Take one tablet daily  Dispense: 4 tablet; Refill: 1  -     methylPREDNISolone acetate injection 160 mg; Inject 2 mLs (160 mg total) into the muscle one time.  -     ketorolac injection 60 mg; Inject 2 mLs (60 mg total) into the muscle one time.  -     cyanocobalamin injection 1,000 mcg; Inject 1 mL (1,000 mcg total) into the muscle one time.  -     Comprehensive metabolic panel; Future; Expected date: 06/07/2017  -     C-reactive protein; Future; Expected date: 06/07/2017  -     TSH; Future; Expected date: 06/07/2017  -     T4, free; Future; Expected date: 06/07/2017  -     T3, free; Future; Expected date: 06/07/2017  -     Sedimentation rate, manual; Future; Expected date: 06/07/2017  -     Rheumatoid factor; Future; Expected date: 06/07/2017  -     CBC auto differential; Future; Expected date: 06/07/2017  -      Urinalysis; Future; Expected date: 06/07/2017  -     Cyclic citrul peptide antibody, IgG; Future; Expected date: 06/07/2017  -     methotrexate 2.5 MG Tab; Take 8 tablets (20 mg total) by mouth every 7 days.  Dispense: 96 tablet; Refill: 1  -     predniSONE (DELTASONE) 5 MG tablet; 3 tabs po qam prn flare  Dispense: 90 tablet; Refill: 3  -     ergocalciferol (ERGOCALCIFEROL) 50,000 unit Cap; Take 1 capsule (50,000 Units total) by mouth every 7 days.  Dispense: 4 capsule; Refill: 5  -     folic acid (FOLVITE) 1 MG tablet; Take 1 tablet (1,000 mcg total) by mouth once daily.  Dispense: 90 tablet; Refill: 3  -     omeprazole (PRILOSEC) 40 MG capsule; Take 1 capsule (40 mg total) by mouth once daily.  Dispense: 90 capsule; Refill: 3  -     atenolol (TENORMIN) 50 MG tablet; Take 1 tablet (50 mg total) by mouth 2 (two) times daily.  Dispense: 180 tablet; Refill: 2    Methotrexate, long term, current use  -     acetaminophen-codeine 300-30mg (TYLENOL #3) 300-30 mg Tab; 1 tab po q 6 hrs prn pain , ra flare, psoriatic joint pain  Dispense: 120 tablet; Refill: 3  -     fluconazole (DIFLUCAN) 200 MG Tab; Take one tablet daily  Dispense: 4 tablet; Refill: 1  -     methylPREDNISolone acetate injection 160 mg; Inject 2 mLs (160 mg total) into the muscle one time.  -     ketorolac injection 60 mg; Inject 2 mLs (60 mg total) into the muscle one time.  -     cyanocobalamin injection 1,000 mcg; Inject 1 mL (1,000 mcg total) into the muscle one time.  -     Comprehensive metabolic panel; Future; Expected date: 06/07/2017  -     C-reactive protein; Future; Expected date: 06/07/2017  -     TSH; Future; Expected date: 06/07/2017  -     T4, free; Future; Expected date: 06/07/2017  -     T3, free; Future; Expected date: 06/07/2017  -     Sedimentation rate, manual; Future; Expected date: 06/07/2017  -     Rheumatoid factor; Future; Expected date: 06/07/2017  -     CBC auto differential; Future; Expected date: 06/07/2017  -      Urinalysis; Future; Expected date: 06/07/2017  -     Cyclic citrul peptide antibody, IgG; Future; Expected date: 06/07/2017  -     methotrexate 2.5 MG Tab; Take 8 tablets (20 mg total) by mouth every 7 days.  Dispense: 96 tablet; Refill: 1  -     predniSONE (DELTASONE) 5 MG tablet; 3 tabs po qam prn flare  Dispense: 90 tablet; Refill: 3  -     ergocalciferol (ERGOCALCIFEROL) 50,000 unit Cap; Take 1 capsule (50,000 Units total) by mouth every 7 days.  Dispense: 4 capsule; Refill: 5  -     folic acid (FOLVITE) 1 MG tablet; Take 1 tablet (1,000 mcg total) by mouth once daily.  Dispense: 90 tablet; Refill: 3    NSAID long-term use  -     methotrexate 2.5 MG Tab; Take 8 tablets (20 mg total) by mouth every 7 days.  Dispense: 96 tablet; Refill: 1    Essential hypertension  -     folic acid (FOLVITE) 1 MG tablet; Take 1 tablet (1,000 mcg total) by mouth once daily.  Dispense: 90 tablet; Refill: 3  -     omeprazole (PRILOSEC) 40 MG capsule; Take 1 capsule (40 mg total) by mouth once daily.  Dispense: 90 capsule; Refill: 3  -     atenolol (TENORMIN) 50 MG tablet; Take 1 tablet (50 mg total) by mouth 2 (two) times daily.  Dispense: 180 tablet; Refill: 2

## 2017-10-30 ENCOUNTER — PATIENT MESSAGE (OUTPATIENT)
Dept: RHEUMATOLOGY | Facility: CLINIC | Age: 67
End: 2017-10-30

## 2017-10-30 DIAGNOSIS — M15.9 PRIMARY OSTEOARTHRITIS INVOLVING MULTIPLE JOINTS: ICD-10-CM

## 2017-10-30 DIAGNOSIS — M81.0 OSTEOPOROSIS: ICD-10-CM

## 2017-10-30 DIAGNOSIS — I10 ESSENTIAL HYPERTENSION: ICD-10-CM

## 2017-10-30 DIAGNOSIS — L40.50 PSORIATIC ARTHRITIS: ICD-10-CM

## 2017-10-30 DIAGNOSIS — M79.7 FIBROMYALGIA: ICD-10-CM

## 2017-11-01 DIAGNOSIS — M79.7 FIBROMYALGIA: ICD-10-CM

## 2017-11-01 DIAGNOSIS — I10 ESSENTIAL HYPERTENSION: ICD-10-CM

## 2017-11-01 DIAGNOSIS — M81.0 OSTEOPOROSIS, UNSPECIFIED OSTEOPOROSIS TYPE, UNSPECIFIED PATHOLOGICAL FRACTURE PRESENCE: ICD-10-CM

## 2017-11-01 DIAGNOSIS — Z79.1 NSAID LONG-TERM USE: ICD-10-CM

## 2017-11-01 DIAGNOSIS — M81.0 OSTEOPOROSIS: ICD-10-CM

## 2017-11-01 DIAGNOSIS — Z79.631 METHOTREXATE, LONG TERM, CURRENT USE: ICD-10-CM

## 2017-11-01 DIAGNOSIS — L40.50 PSORIATIC ARTHRITIS: ICD-10-CM

## 2017-11-01 DIAGNOSIS — M15.9 PRIMARY OSTEOARTHRITIS INVOLVING MULTIPLE JOINTS: ICD-10-CM

## 2017-11-02 RX ORDER — FOLIC ACID 1 MG/1
TABLET ORAL
Qty: 90 TABLET | Refills: 0 | Status: SHIPPED | OUTPATIENT
Start: 2017-11-02 | End: 2017-11-07

## 2017-11-02 RX ORDER — METHOTREXATE 2.5 MG/1
TABLET ORAL
Qty: 96 TABLET | Refills: 0 | Status: SHIPPED | OUTPATIENT
Start: 2017-11-02 | End: 2017-11-07 | Stop reason: SDUPTHER

## 2017-11-06 RX ORDER — FOLIC ACID 1 MG/1
TABLET ORAL
Qty: 90 TABLET | Refills: 3 | Status: SHIPPED | OUTPATIENT
Start: 2017-11-06 | End: 2017-11-07

## 2017-11-07 ENCOUNTER — TELEPHONE (OUTPATIENT)
Dept: RHEUMATOLOGY | Facility: CLINIC | Age: 67
End: 2017-11-07

## 2017-11-07 DIAGNOSIS — M81.0 OSTEOPOROSIS, UNSPECIFIED OSTEOPOROSIS TYPE, UNSPECIFIED PATHOLOGICAL FRACTURE PRESENCE: ICD-10-CM

## 2017-11-07 DIAGNOSIS — M15.9 PRIMARY OSTEOARTHRITIS INVOLVING MULTIPLE JOINTS: ICD-10-CM

## 2017-11-07 DIAGNOSIS — I10 ESSENTIAL HYPERTENSION: ICD-10-CM

## 2017-11-07 DIAGNOSIS — Z79.631 METHOTREXATE, LONG TERM, CURRENT USE: ICD-10-CM

## 2017-11-07 DIAGNOSIS — L40.50 PSORIATIC ARTHRITIS: ICD-10-CM

## 2017-11-07 DIAGNOSIS — Z79.1 NSAID LONG-TERM USE: ICD-10-CM

## 2017-11-07 DIAGNOSIS — M79.7 FIBROMYALGIA: ICD-10-CM

## 2017-11-07 NOTE — TELEPHONE ENCOUNTER
----- Message from Radha Perez sent at 11/6/2017  2:52 PM CST -----  Please fax orders for lab to [facility].  Also need refills on all medications, please send into [email or web address].  Please call with any questions to [phone].

## 2017-11-10 RX ORDER — SIMVASTATIN 40 MG/1
40 TABLET, FILM COATED ORAL NIGHTLY
Qty: 90 TABLET | Refills: 3 | Status: SHIPPED | OUTPATIENT
Start: 2017-11-10 | End: 2020-10-22 | Stop reason: SDUPTHER

## 2017-11-10 RX ORDER — OMEPRAZOLE 40 MG/1
40 CAPSULE, DELAYED RELEASE ORAL DAILY
Qty: 90 CAPSULE | Refills: 3 | Status: SHIPPED | OUTPATIENT
Start: 2017-11-10 | End: 2020-10-22 | Stop reason: SDUPTHER

## 2017-11-10 RX ORDER — PREDNISONE 5 MG/1
TABLET ORAL
Qty: 90 TABLET | Refills: 3 | Status: SHIPPED | OUTPATIENT
Start: 2017-11-10 | End: 2019-04-01

## 2017-11-10 RX ORDER — FOLIC ACID 1 MG/1
1000 TABLET ORAL DAILY
Qty: 90 TABLET | Refills: 3 | Status: SHIPPED | OUTPATIENT
Start: 2017-11-10 | End: 2018-07-31 | Stop reason: SDUPTHER

## 2017-11-10 RX ORDER — METHOTREXATE 2.5 MG/1
TABLET ORAL
Qty: 96 TABLET | Refills: 0 | Status: SHIPPED | OUTPATIENT
Start: 2017-11-10 | End: 2017-12-05

## 2017-11-10 RX ORDER — ATENOLOL 50 MG/1
50 TABLET ORAL 2 TIMES DAILY
Qty: 180 TABLET | Refills: 2 | Status: SHIPPED | OUTPATIENT
Start: 2017-11-10 | End: 2018-09-10 | Stop reason: CLARIF

## 2017-11-10 RX ORDER — ACETAMINOPHEN AND CODEINE PHOSPHATE 300; 30 MG/1; MG/1
TABLET ORAL
Qty: 120 TABLET | Refills: 3 | Status: ON HOLD | OUTPATIENT
Start: 2017-11-10 | End: 2018-09-21 | Stop reason: HOSPADM

## 2017-11-13 ENCOUNTER — PATIENT MESSAGE (OUTPATIENT)
Dept: RHEUMATOLOGY | Facility: CLINIC | Age: 67
End: 2017-11-13

## 2017-12-05 ENCOUNTER — OFFICE VISIT (OUTPATIENT)
Dept: RHEUMATOLOGY | Facility: CLINIC | Age: 67
End: 2017-12-05
Payer: MEDICARE

## 2017-12-05 VITALS
HEART RATE: 74 BPM | DIASTOLIC BLOOD PRESSURE: 87 MMHG | BODY MASS INDEX: 31.18 KG/M2 | WEIGHT: 194 LBS | SYSTOLIC BLOOD PRESSURE: 174 MMHG | HEIGHT: 66 IN

## 2017-12-05 DIAGNOSIS — Z79.631 METHOTREXATE, LONG TERM, CURRENT USE: ICD-10-CM

## 2017-12-05 DIAGNOSIS — M15.9 PRIMARY OSTEOARTHRITIS INVOLVING MULTIPLE JOINTS: Primary | ICD-10-CM

## 2017-12-05 DIAGNOSIS — L40.50 PSORIATIC ARTHRITIS: ICD-10-CM

## 2017-12-05 PROCEDURE — 99214 OFFICE O/P EST MOD 30 MIN: CPT | Mod: 25,S$GLB,, | Performed by: INTERNAL MEDICINE

## 2017-12-05 PROCEDURE — 96372 THER/PROPH/DIAG INJ SC/IM: CPT | Mod: S$GLB,,, | Performed by: INTERNAL MEDICINE

## 2017-12-05 PROCEDURE — 99999 PR PBB SHADOW E&M-EST. PATIENT-LVL III: CPT | Mod: PBBFAC,,, | Performed by: INTERNAL MEDICINE

## 2017-12-05 RX ORDER — KETOROLAC TROMETHAMINE 30 MG/ML
60 INJECTION, SOLUTION INTRAMUSCULAR; INTRAVENOUS
Status: COMPLETED | OUTPATIENT
Start: 2017-12-05 | End: 2017-12-05

## 2017-12-05 RX ORDER — CYANOCOBALAMIN 1000 UG/ML
1000 INJECTION, SOLUTION INTRAMUSCULAR; SUBCUTANEOUS
Status: COMPLETED | OUTPATIENT
Start: 2017-12-05 | End: 2017-12-05

## 2017-12-05 RX ORDER — METHYLPREDNISOLONE ACETATE 80 MG/ML
160 INJECTION, SUSPENSION INTRA-ARTICULAR; INTRALESIONAL; INTRAMUSCULAR; SOFT TISSUE
Status: COMPLETED | OUTPATIENT
Start: 2017-12-05 | End: 2017-12-05

## 2017-12-05 RX ADMIN — KETOROLAC TROMETHAMINE 60 MG: 30 INJECTION, SOLUTION INTRAMUSCULAR; INTRAVENOUS at 11:12

## 2017-12-05 RX ADMIN — CYANOCOBALAMIN 1000 MCG: 1000 INJECTION, SOLUTION INTRAMUSCULAR; SUBCUTANEOUS at 11:12

## 2017-12-05 RX ADMIN — METHYLPREDNISOLONE ACETATE 160 MG: 80 INJECTION, SUSPENSION INTRA-ARTICULAR; INTRALESIONAL; INTRAMUSCULAR; SOFT TISSUE at 11:12

## 2017-12-05 NOTE — PROGRESS NOTES
Subjective:       Patient ID: Mary Reyes is a 67 y.o. female.    Chief Complaint: Disease Management and Pain    Follow up :PSA on MTX, doing fair neck pain, shoulder pain elbow pain,, knee pain. Patient complains of arthralgias and myalgias for which has been present for a few years. Pain is located in multiple joints, both shoulder(s), both elbow(s), both wrist(s), both MCP(s): 1st, 2nd, 3rd, 4th and 5th, both PIP(s): 1st, 2nd, 3rd, 4th and 5th, both DIP(s): 1st and 2nd, both hip(s), both knee(s) and both MTP(s): 1st, 2nd, 3rd, 4th and 5th, is described as aching, pulsating, shooting and throbbing, and is constant, moderate .  Associated symptoms include: crepitation, decreased range of motion, edema, effusion, tenderness and warmth.        Review of Systems   Constitutional: Positive for activity change. Negative for appetite change, chills, diaphoresis and unexpected weight change.   HENT: Negative for congestion, dental problem, ear discharge, ear pain, facial swelling, mouth sores, nosebleeds, postnasal drip, rhinorrhea, sinus pressure, sneezing, sore throat, tinnitus and voice change.    Eyes: Negative for photophobia, pain, discharge, redness and itching.   Respiratory: Negative for apnea, cough, chest tightness, shortness of breath and wheezing.    Cardiovascular: Positive for leg swelling. Negative for chest pain and palpitations.   Gastrointestinal: Negative for abdominal distention, abdominal pain, constipation, diarrhea, nausea and vomiting.   Endocrine: Negative for cold intolerance, heat intolerance, polydipsia and polyuria.   Genitourinary: Negative for decreased urine volume, difficulty urinating, flank pain, frequency, hematuria and urgency.   Musculoskeletal: Positive for arthralgias, back pain, neck pain and neck stiffness. Negative for gait problem.   Skin: Positive for rash. Negative for pallor and wound.   Allergic/Immunologic: Positive for immunocompromised state.   Neurological: Positive  "for numbness. Negative for dizziness, tremors and weakness.   Hematological: Negative for adenopathy. Bruises/bleeds easily.   Psychiatric/Behavioral: Positive for sleep disturbance. The patient is not nervous/anxious.          Objective:     BP (!) 174/87 (BP Location: Right arm, Patient Position: Sitting, BP Method: Small (Automatic))   Pulse 74   Ht 5' 6" (1.676 m)   Wt 88 kg (194 lb)   BMI 31.31 kg/m²      Physical Exam   Nursing note and vitals reviewed.  Constitutional: She is oriented to person, place, and time and well-developed, well-nourished, and in no distress. No distress.   HENT:   Head: Normocephalic and atraumatic.   Mouth/Throat: Oropharynx is clear and moist.   Eyes: EOM are normal. Pupils are equal, round, and reactive to light.   Neck: Neck supple. No thyromegaly present.   Cardiovascular: Normal rate, regular rhythm and normal heart sounds.  Exam reveals no gallop and no friction rub.    No murmur heard.  Pulmonary/Chest: Breath sounds normal. She has no wheezes. She has no rales. She exhibits no tenderness.   Abdominal: There is no tenderness. There is no rebound and no guarding.       Right Side Rheumatological Exam     Examination finds the 1st MCP, 2nd MCP, 3rd MCP, 4th MCP and 5th MCP normal.    The patient is tender to palpation of the shoulder, elbow, wrist, knee, 1st PIP, 1st MCP, 2nd PIP, 2nd MCP, 3rd PIP, 3rd MCP, 4th PIP, 4th MCP, 5th PIP and 5th MCP    She has swelling of the shoulder, elbow, knee, 1st PIP, 1st MCP, 2nd PIP, 2nd MCP, 3rd PIP, 3rd MCP, 4th PIP, 4th MCP, 5th PIP and 5th MCP    The patient has an enlarged wrist, 1st PIP, 2nd PIP, 3rd PIP, 4th PIP and 5th PIP    Shoulder Exam   Tenderness Location: acromion    Range of Motion   Active Abduction: abnormal   Adduction: abnormal  Sensation: decreased    Knee Exam   Tenderness Location: medial joint line and lateral joint line  Patellofemoral Crepitus: positive  Effusion: positive  Warmth: positive  Sensation: " decreased    Hip Exam   Tenderness Location: posterior    Range of Motion   Extension: abnormal   Flexion: abnormal   Abduction: abnormal   Adduction: abnormal   Sensation: decreased    Elbow/Wrist Exam   Tenderness Location: no tenderness  Sensation: normal    Foot Exam   Right foot exam exhibits signs of inflamed dorsum  Right foot exam exhibits signs of no podagra, no tophus and no plantar fasciitis  Ankle Swelling: positive  Ankle Crepitus: positive  Foot Swelling: postitive  Foot Crepitus: positive    Muscle Strength (0-5 scale):  : 4/5     Left Side Rheumatological Exam     Examination finds the knee, 1st MCP, 2nd MCP, 3rd MCP, 4th MCP and 5th MCP normal.    The patient is tender to palpation of the shoulder, elbow, wrist, knee, 1st PIP, 1st MCP, 2nd PIP, 2nd MCP, 3rd PIP, 3rd MCP, 4th PIP, 4th MCP, 5th PIP and 5th MCP.    She has swelling of the shoulder, elbow, 1st PIP, 1st MCP, 2nd PIP, 2nd MCP, 3rd PIP, 3rd MCP, 4th PIP, 4th MCP, 5th PIP and 5th MCP    The patient has an enlarged shoulder, wrist, 1st PIP, 2nd PIP, 3rd PIP, 4th PIP and 5th PIP.    Shoulder Exam   Tenderness Location: acromion    Range of Motion   Active Abduction: abnormal   Sensation: decreased    Knee Exam   Tenderness Location: lateral joint line and medial joint line    Patellofemoral Crepitus: positive  Effusion: positive  Warmth: positive  Sensation: decreased    Hip Exam   Tenderness Location: posterior    Range of Motion   Extension: abnormal   Flexion: abnormal   Abduction: abnormal   Adduction: abnormal   Sensation: decreased    Elbow/Wrist Exam   Tenderness Location: lateral epicondyle and medial epicondyle  Sensation: normal    Foot Exam   Left foot exam exhibits signs of inflamed dorsum  Left foot exam exhibits signs of no podagra and no plantar fasciitis  Ankle Swelling: positive  Foot Swelling: positive  Foot Crepitus: positive    Muscle Strength (0-5 scale):  :  4/5   Quadriceps:  2       Back/Neck Exam   General  Inspection   Gait: L knee brace.       Tenderness Right paramedian tenderness of the Lower C-Spine, Lower L-Spine and SI Joint.Left paramedian tenderness of the Upper C-Spine, Lower L-Spine and SI Joint.      Comments:  15 out of 18 tender points    Lymphadenopathy:     She has no cervical adenopathy.   Neurological: She is alert and oriented to person, place, and time. Gait normal.   Reflex Scores:       Patellar reflexes are 3+ on the right side and 3+ on the left side.  Skin: Rash noted. No erythema. No pallor.     B feet, knee knees , elbows   Psychiatric: Mood and affect normal.   Musculoskeletal: She exhibits tenderness and deformity. She exhibits no edema.   Neck pain, shoulder pain, wrist pain            URINALYSIS WITH REFLEX CULTURE (12/04/2017 8:47 AM)  URINALYSIS WITH REFLEX CULTURE (12/04/2017 8:47 AM)   Component Value Ref Range   COLOR UA Pale Yellow Pale to dark yellow   CLARITY UA Clear Clear   SPECIFIC GRAVITY UA 1.004 (L) 1.010 - 1.030   PH UA 7.0 5.0 - 8.0   LEUKOCYTE ESTERASE UA Trace (A) Negative   NITRITE UA Negative Negative   PROTEIN UA Negative Negative   GLUCOSE UA Negative Negative   KETONES UA Negative Negative   UROBILINOGEN UA Normal <2.0 mg/dL   BILIRUBIN UA Negative Negative   BLOOD UA Negative Negative   WBC UA 6-10 (A) 0 - 2 /hpf   RBC UA 0-2 0 - 2 /hpf   BACTERIA UA Negative Negative /hpf   EPITHELIAL CELLS, URINE 0-5 0 - 5 /hpf     URINALYSIS WITH REFLEX CULTURE (12/04/2017 8:47 AM)   Specimen Performing Laboratory   Urine - Urine, clean catch [facility]    CLIA #[id number]    [address] [place] [address]       URINALYSIS WITH REFLEX CULTURE (12/04/2017 8:47 AM)   Narrative   Based on results, a urine culture has been reflexed.     Back to top of Lab Results      CBC WITH DIFFERENTIAL (12/04/2017 8:32 AM)  CBC WITH DIFFERENTIAL (12/04/2017 8:32 AM)   Component Value Ref Range   WBC 5.7 4.5 - 11.0 K/uL   RBC 4.66 4.00 - 5.20 M/uL   HEMOGLOBIN 13.4 12.0 -  16.0 g/dL   HEMATOCRIT 40.0 35.0 - 45.0 %   MCV 85.7 80.0 - 100.0 fL   MCH 28.7 26.0 - 34.0 pg   MCHC 33.5 31.0 - 37.0 g/dL   RDW 13.0 11.5 - 14.5 %   PLATELETS 305 130 - 400 K/uL   MPV 8.5 7.4 - 10.4 fL   NEUTROPHILS 41 %   LYMPHOCYTES 47 %   MONOCYTES 8 %   EOSINOPHILS 2 %   BASOPHILS 1 %   NEUTROPHIL ABSOLUTE 2.30 1.80 - 8.00 K/uL   LYMPHOCYTE ABSOLUTE 2.70 1.10 - 5.00 K/uL   MONOCYTE ABSOLUTE 0.50 0.20 - 1.10 K/uL   EOSINOPHIL ABSOLUTE 0.10 0.00 - 0.60 K/uL   BASOPHILS ABSOLUTE 0.00 0.00 - 0.20 K/uL     CBC WITH DIFFERENTIAL (12/04/2017 8:32 AM)   Specimen Performing Laboratory   [facility]    CLIA #[id number]    [address] [place] [address]     Back to top of Lab Results      RHEUMATOID FACTOR (12/04/2017 8:32 AM)  RHEUMATOID FACTOR (12/04/2017 8:32 AM)   Component Value Ref Range   RHEUMATOID FACTOR <15  Comment:   Perform at [facility]  [address] [place] [address] [phone] - 30 IU/mL     RHEUMATOID FACTOR (12/04/2017 8:32 AM)   Specimen Performing Laboratory   [facility] REF [facility] - [facility]     Back to top of Lab Results      SEDIMENTATION RATE (12/04/2017 8:32 AM)  SEDIMENTATION RATE (12/04/2017 8:32 AM)   Component Value Ref Range   ESR (SEDIMENTATION RATE) 21 <40 mm/Hr     SEDIMENTATION RATE (12/04/2017 8:32 AM)   Specimen Performing Laboratory   [facility]    CLIA #[id number]    [address] [place] [address]     Back to top of Lab Results      T3 FREE (12/04/2017 8:32 AM)  T3 FREE (12/04/2017 8:32 AM)   Component Value Ref Range   T3 FREE 3.6 2.4 - 6.8 pg/mL     T3 FREE (12/04/2017 8:32 AM)   Specimen Performing Laboratory   [facility]    CLIA #[id number]    [address] [place] [address]     Back to top of Lab Results      T4 FREE (12/04/2017 8:32 AM)  T4 FREE (12/04/2017 8:32 AM)   Component Value Ref Range   T4 FREE 0.99 0.60 - 1.15 ng/dL     T4 FREE (12/04/2017 8:32 AM)    Specimen Performing Laboratory   [facility]    CLIA #[id number]    [address] [place] [address]     Back to top of Lab Results      TSH (12/04/2017 8:32 AM)  TSH (12/04/2017 8:32 AM)   Component Value Ref Range   TSH 1.14 0.50 - 5.00 uIU/mL     TSH (12/04/2017 8:32 AM)   Specimen Performing Laboratory   [facility]    CLIA #[id number]    [address] [place] [address]     Back to top of Lab Results      C-REACTIVE PROTEIN (12/04/2017 8:32 AM)  C-REACTIVE PROTEIN (12/04/2017 8:32 AM)   Component Value Ref Range   CRP 5.8  Comment:     Please note new unit of measure and reference range for CRP starting June 19, 2016. <=9.0 mg/L     C-REACTIVE PROTEIN (12/04/2017 8:32 AM)   Specimen Performing Laboratory   [facility]    CLIA #[id number]    [address] [place] [address]     Back to top of Lab Results      COMPREHENSIVE METABOLIC PANEL (12/04/2017 8:32 AM)  COMPREHENSIVE METABOLIC PANEL (12/04/2017 8:32 AM)   Component Value Ref Range   SODIUM 138 135 - 146 mmol/L   POTASSIUM 3.7 3.6 - 5.2 mmol/L   CHLORIDE 104 96 - 110 mmol/L   CO2 28 24 - 32 mmol/L   CALCIUM 9.3 8.4 - 10.3 mg/dL   BUN 10 7 - 25 mg/dL   CREATININE 0.63 0.50 - 1.10 mg/dL   GLUCOSE 118 (H) 65 - 99 mg/dL   TOTAL PROTEIN 7.0 6.0 - 8.0 g/dL   ALBUMIN 4.0 3.4 - 5.0 g/dL   BILIRUBIN TOTAL 0.7 0.0 - 1.3 mg/dL   ALKALINE PHOSPHATASE 82 20 - 120 U/L   AST 19 <=45 U/L   ALT 21 <=46 U/L   GFR >60  Comment:     eGFR has not been validated for use in the elderly (> 70 years of age), pregnant women, patients with serious co-morbid conditions, or persons with extremes of body size or muscle mass and should also be interpreted with caution in patients with acute kidney failure, dialysis dependent patients, patients reporting exceptional dietary intake (e.g. vegetarian diet, high protein diets, creatine supplementation), and patients with severe liver disease.     Based on  National Kidney Disease Education Program     ** If patient is , please refer to the GFR  result.       >=60 mL/min/1.73 sq meter   GFR, AFRICAN AMERICAN >60 >=60 mL/min/1.73 sq meter     COMPREHENSIVE METABOLIC PANEL (12/04/2017 8:32 AM)   Specimen Performing Laboratory   [facility]    CLIA #[id number]    [address] [place] [address]           Assessment:         Encounter Diagnoses   Name Primary?    Primary osteoarthritis involving multiple joints Yes    Methotrexate, long term, current use     Psoriatic arthritis     Comment: change to injectable         Plan:     Primary osteoarthritis involving multiple joints  -     methylPREDNISolone acetate injection 160 mg; Inject 2 mLs (160 mg total) into the muscle one time.  -     ketorolac injection 60 mg; Inject 2 mLs (60 mg total) into the muscle one time.  -     cyanocobalamin injection 1,000 mcg; Inject 1 mL (1,000 mcg total) into the muscle one time.  -     methotrexate, PF, (RASUVO, PF,) 20 mg/0.4 mL AtIn; Inject 1 each into the skin every 7 days.  Dispense: 1.6 mL; Refill: 6  -     Comprehensive metabolic panel; Future; Expected date: 12/05/2017  -     CBC auto differential; Future; Expected date: 12/05/2017  -     C-reactive protein; Future; Expected date: 12/05/2017  -     Sedimentation rate, manual; Future; Expected date: 12/05/2017  -     Ambulatory consult to [facility]    Methotrexate, long term, current use  -     methylPREDNISolone acetate injection 160 mg; Inject 2 mLs (160 mg total) into the muscle one time.  -     ketorolac injection 60 mg; Inject 2 mLs (60 mg total) into the muscle one time.  -     cyanocobalamin injection 1,000 mcg; Inject 1 mL (1,000 mcg total) into the muscle one time.  -     methotrexate, PF, (RASUVO, PF,) 20 mg/0.4 mL AtIn; Inject 1 each into the skin every 7 days.  Dispense: 1.6 mL; Refill: 6  -     Comprehensive metabolic panel; Future; Expected date:  12/05/2017  -     CBC auto differential; Future; Expected date: 12/05/2017  -     C-reactive protein; Future; Expected date: 12/05/2017  -     Sedimentation rate, manual; Future; Expected date: 12/05/2017  -     Ambulatory consult to [facility]    Psoriatic arthritis  Comments:  change to injectable  Orders:  -     methylPREDNISolone acetate injection 160 mg; Inject 2 mLs (160 mg total) into the muscle one time.  -     ketorolac injection 60 mg; Inject 2 mLs (60 mg total) into the muscle one time.  -     cyanocobalamin injection 1,000 mcg; Inject 1 mL (1,000 mcg total) into the muscle one time.  -     methotrexate, PF, (RASUVO, PF,) 20 mg/0.4 mL AtIn; Inject 1 each into the skin every 7 days.  Dispense: 1.6 mL; Refill: 6  -     Comprehensive metabolic panel; Future; Expected date: 12/05/2017  -     CBC auto differential; Future; Expected date: 12/05/2017  -     C-reactive protein; Future; Expected date: 12/05/2017  -     Sedimentation rate, manual; Future; Expected date: 12/05/2017  -     Ambulatory consult to [facility]    over 50% of this visit was explain labs and possible disease states and course of treatments

## 2017-12-05 NOTE — PROGRESS NOTES
Administered 1 cc ( 1000 mcg/ml ) of b12 to the right upper outer gluteal. Informed of s/s to report verbalized understanding. No adverse reactions noted.    Lot # [id number]  Expiration apr 19    Administered 2 cc ( 80 mg/ml ) of depomedrol to the right upper outer gluteal. Informed of s/s to report verbalized understanding. No adverse reactions noted.    Lot # [id number]  Expiration 10/2018    Administered 2 cc ( 30 mg/ml ) of toradol to the left upper outer gluteal. Informed of s/s to report verbalized understanding. No adverse reactions noted.    Lot # [id number]  Expiration 05/19

## 2017-12-06 ENCOUNTER — TELEPHONE (OUTPATIENT)
Dept: PHARMACY | Facility: CLINIC | Age: 67
End: 2017-12-06

## 2017-12-15 ENCOUNTER — TELEPHONE (OUTPATIENT)
Dept: RHEUMATOLOGY | Facility: CLINIC | Age: 67
End: 2017-12-15

## 2017-12-15 NOTE — TELEPHONE ENCOUNTER
----- Message from Ros Romano sent at 12/14/2017  9:18 AM CST -----  Contact: self [phone]  Patient called asking for a new rx methotrexate injectables with [facility]. Thanks!

## 2017-12-15 NOTE — TELEPHONE ENCOUNTER
----- Message from Whitney Clark RT sent at 12/15/2017 11:41 AM CST -----  Contact: pt   Called pod, pt , returned missed call, thanks.

## 2018-01-19 ENCOUNTER — TELEPHONE (OUTPATIENT)
Dept: PAIN MEDICINE | Facility: CLINIC | Age: 68
End: 2018-01-19

## 2018-01-19 NOTE — TELEPHONE ENCOUNTER
Spoke with [employer] regarding patients Rasuvo. They are asking for dx codes for medication. The ones listed are not approved. They stated RA is the only dx the medication will be approved for.

## 2018-01-19 NOTE — TELEPHONE ENCOUNTER
----- Message from Sara Rojas sent at 1/19/2018  1:01 PM CST -----  Contact: Wanda Burnett want to speak with a nurse regarding diagnosis code for resuvo please call back at [phone]

## 2018-01-31 ENCOUNTER — TELEPHONE (OUTPATIENT)
Dept: RHEUMATOLOGY | Facility: CLINIC | Age: 68
End: 2018-01-31

## 2018-01-31 NOTE — TELEPHONE ENCOUNTER
LVM to notify pt [employer] denied [employer] assistance application due to patient being over income and PsoA not being FDA approved diagnosis. Her current copay is $47.00. Will continue to follow up to see how she would like to proceed.

## 2018-02-01 NOTE — TELEPHONE ENCOUNTER
----- Message from Robin LOUIE Christelrhona sent at 1/26/2018  4:48 PM CST -----  Contact: Wanda/[employer] (Rx assistance program)  Wanda called in regarding the attached patient and her Rx methotrexate, PF, (RASUVO, PF,) 20 mg/0.4 mL AtIn.   Wanda stated she needs a primary diagnoses code for this medication.    Wanda's call back number is [phone] and fax number is [phone], fax or verbal is accepted.

## 2018-02-05 NOTE — TELEPHONE ENCOUNTER
Spoke with Wanda, advises pt is over income for assistance but they are doing a benefits investigation to further assist.

## 2018-02-06 ENCOUNTER — TELEPHONE (OUTPATIENT)
Dept: PHARMACY | Facility: HOSPITAL | Age: 68
End: 2018-02-06

## 2018-02-14 NOTE — TELEPHONE ENCOUNTER
LVM for pt to notify her that [employer] assistance application has been denied as she is over the income limit. As of now her copay is $47 and should stay the same through the gap and go down to $8.35 in catastrophic stage. Will continue to follow up.

## 2018-02-21 NOTE — TELEPHONE ENCOUNTER
Dr Asher and Staff,    [facility] has been attempting to reach Ms. Reyes regarding prescription for Rasuvo. After numerous unsuccessful attempts, we are sending a postcard to the address on file. At this point in time, no further contact will be made from [facility] until patient responds to our mail correspondence.    If there is anything else we can do to further assist, please let us know.     Thanks,  Theresa Morgan, PharmD  [facility]  [phone]

## 2018-02-21 NOTE — TELEPHONE ENCOUNTER
Initial Rasuvo 20mg/0.4ml Pen consult completed on 18. Rasuvo 20mg/.04ml Pen  will be shipped on 18 to arrive at patient's home on 18 via [employer]. $ 47.00 copay. Patient will start Rasuvo 20mg/0.4ml Injection on 18. Address confirmed. Confirmed 2 patient identifiers - name and . Therapy Appropriate.    Counseled patient on administration directions:  - Inject Rasuvo 20mg (1pen) into the skin every 7 days.  - Monthly RX will come with gauze, bandaids, and alcohol swabs.  - Patient was instructed to rotate injections sites.  - Patient will use sharps container; once full, per [place] law, she/ he may lock the sharps container and place in her trash. She/ he can then contact the Pharmacy and we will replace the sharps at no additional charge.    Patient was counseled on possible side effects:  -Injection site reactions, nausea, stomach pain, indigestion (dyspepsia), mouth sores, rash, which should resolve within 3-5 days.    DDIs:  Medication list reviewed.    Patient verbalized understanding. Compliance stressed. Patient advised to keep a calendar marking dates of injections to ensure better compliance. Patient advised to call myself or provider should any questions arise. Consultation included: indication; goals of treatment; administration; storage and handling; side effects; the importance of compliance; the importance of laboratory monitoring; the importance of keeping all follow up appointments.  Patient understands to report any medication changes to [facility] and provider. All questions answered and addressed to patients satisfaction. I will f/u with her in 1 week from start, [facility] to contact patient in 3 weeks for refills.

## 2018-03-15 ENCOUNTER — TELEPHONE (OUTPATIENT)
Dept: PHARMACY | Facility: CLINIC | Age: 68
End: 2018-03-15

## 2018-03-26 NOTE — TELEPHONE ENCOUNTER
Dr Asher and Staff,    [facility] has been attempting to reach Ms. Reyes regarding prescription for Rasuvo. After numerous unsuccessful attempts, we are sending a postcard to the address on file. At this point in time, no further contact will be made from [facility] until patient responds to our mail correspondence.    If there is anything else we can do to further assist, please let us know.     Thanks,  Lucy Camp, PharmD  [facility]- Clinical Pharmacist  [phone]

## 2018-07-31 DIAGNOSIS — I10 ESSENTIAL HYPERTENSION: ICD-10-CM

## 2018-07-31 DIAGNOSIS — M15.9 PRIMARY OSTEOARTHRITIS INVOLVING MULTIPLE JOINTS: ICD-10-CM

## 2018-07-31 DIAGNOSIS — M81.0 OSTEOPOROSIS, UNSPECIFIED OSTEOPOROSIS TYPE, UNSPECIFIED PATHOLOGICAL FRACTURE PRESENCE: ICD-10-CM

## 2018-07-31 DIAGNOSIS — Z79.631 METHOTREXATE, LONG TERM, CURRENT USE: ICD-10-CM

## 2018-07-31 DIAGNOSIS — L40.50 PSORIATIC ARTHRITIS: ICD-10-CM

## 2018-07-31 DIAGNOSIS — M79.7 FIBROMYALGIA: ICD-10-CM

## 2018-08-03 RX ORDER — FOLIC ACID 1 MG/1
TABLET ORAL
Qty: 90 TABLET | Refills: 3 | Status: SHIPPED | OUTPATIENT
Start: 2018-08-03 | End: 2019-04-01 | Stop reason: SDUPTHER

## 2018-08-03 RX ORDER — ACETAMINOPHEN AND CODEINE PHOSPHATE 300; 30 MG/1; MG/1
TABLET ORAL
Qty: 120 TABLET | OUTPATIENT
Start: 2018-08-03

## 2018-09-21 PROBLEM — M19.012 DJD OF LEFT SHOULDER: Status: ACTIVE | Noted: 2018-09-21

## 2018-10-24 ENCOUNTER — TELEPHONE (OUTPATIENT)
Dept: RHEUMATOLOGY | Facility: CLINIC | Age: 68
End: 2018-10-24

## 2018-10-24 NOTE — TELEPHONE ENCOUNTER
----- Message from Onofre Valenzuela sent at 10/24/2018  9:00 AM CDT -----  Contact: patient  Mary, [phone]. Calling to reschedule today's appointment to sometime in December if at all possible. Please advise. Thanks.

## 2018-10-24 NOTE — TELEPHONE ENCOUNTER
Returned patient call regarding rescheduling appointment. Patient informed office just had shoulder surgery unable to make appointment. Reschedule appointment to 12-6-18 at 230 pm will place letter in mail. Patient verbalized understanding.

## 2018-12-06 ENCOUNTER — TELEPHONE (OUTPATIENT)
Dept: RHEUMATOLOGY | Facility: CLINIC | Age: 68
End: 2018-12-06

## 2018-12-06 NOTE — TELEPHONE ENCOUNTER
----- Message from Marnia Escudero sent at 12/6/2018 10:24 AM CST -----  Contact: pt [phone]  Patient called to reschedule her nati she is not feeling well she is asking to be seen in January please she will need her medication.

## 2019-02-28 ENCOUNTER — TELEPHONE (OUTPATIENT)
Dept: RHEUMATOLOGY | Facility: CLINIC | Age: 69
End: 2019-02-28

## 2019-03-20 ENCOUNTER — TELEPHONE (OUTPATIENT)
Dept: RHEUMATOLOGY | Facility: CLINIC | Age: 69
End: 2019-03-20

## 2019-03-20 NOTE — TELEPHONE ENCOUNTER
----- Message from Harshil Wren sent at 3/20/2019 11:32 AM CDT -----  Contact: Patient  Advised needs to speak with Maria Luz, she needs to reschedule her appnt for tomorrow.  Please call [phone]

## 2019-04-01 ENCOUNTER — OFFICE VISIT (OUTPATIENT)
Dept: RHEUMATOLOGY | Facility: CLINIC | Age: 69
End: 2019-04-01
Payer: MEDICARE

## 2019-04-01 VITALS
HEART RATE: 81 BPM | DIASTOLIC BLOOD PRESSURE: 88 MMHG | HEIGHT: 65 IN | WEIGHT: 180 LBS | SYSTOLIC BLOOD PRESSURE: 154 MMHG | BODY MASS INDEX: 29.99 KG/M2

## 2019-04-01 DIAGNOSIS — M81.0 OSTEOPOROSIS, UNSPECIFIED OSTEOPOROSIS TYPE, UNSPECIFIED PATHOLOGICAL FRACTURE PRESENCE: ICD-10-CM

## 2019-04-01 DIAGNOSIS — L40.50 PSORIATIC ARTHRITIS: Primary | ICD-10-CM

## 2019-04-01 DIAGNOSIS — I10 ESSENTIAL HYPERTENSION: ICD-10-CM

## 2019-04-01 DIAGNOSIS — M15.9 PRIMARY OSTEOARTHRITIS INVOLVING MULTIPLE JOINTS: ICD-10-CM

## 2019-04-01 DIAGNOSIS — Z79.631 METHOTREXATE, LONG TERM, CURRENT USE: ICD-10-CM

## 2019-04-01 DIAGNOSIS — M79.7 FIBROMYALGIA: ICD-10-CM

## 2019-04-01 PROCEDURE — 3077F PR MOST RECENT SYSTOLIC BLOOD PRESSURE >= 140 MM HG: ICD-10-PCS | Mod: S$GLB,,, | Performed by: PHYSICIAN ASSISTANT

## 2019-04-01 PROCEDURE — 3077F SYST BP >= 140 MM HG: CPT | Mod: S$GLB,,, | Performed by: PHYSICIAN ASSISTANT

## 2019-04-01 PROCEDURE — 99214 PR OFFICE/OUTPT VISIT, EST, LEVL IV, 30-39 MIN: ICD-10-PCS | Mod: 25,S$GLB,, | Performed by: PHYSICIAN ASSISTANT

## 2019-04-01 PROCEDURE — 96372 THER/PROPH/DIAG INJ SC/IM: CPT | Mod: 59,S$GLB,, | Performed by: PHYSICIAN ASSISTANT

## 2019-04-01 PROCEDURE — 3079F PR MOST RECENT DIASTOLIC BLOOD PRESSURE 80-89 MM HG: ICD-10-PCS | Mod: S$GLB,,, | Performed by: PHYSICIAN ASSISTANT

## 2019-04-01 PROCEDURE — 96372 PR INJECTION,THERAP/PROPH/DIAG2ST, IM OR SUBCUT: ICD-10-PCS | Mod: 59,S$GLB,, | Performed by: PHYSICIAN ASSISTANT

## 2019-04-01 PROCEDURE — 1101F PT FALLS ASSESS-DOCD LE1/YR: CPT | Mod: S$GLB,,, | Performed by: PHYSICIAN ASSISTANT

## 2019-04-01 PROCEDURE — 1101F PR PT FALLS ASSESS DOC 0-1 FALLS W/OUT INJ PAST YR: ICD-10-PCS | Mod: S$GLB,,, | Performed by: PHYSICIAN ASSISTANT

## 2019-04-01 PROCEDURE — 99999 PR PBB SHADOW E&M-EST. PATIENT-LVL III: CPT | Mod: PBBFAC,,, | Performed by: PHYSICIAN ASSISTANT

## 2019-04-01 PROCEDURE — 99214 OFFICE O/P EST MOD 30 MIN: CPT | Mod: 25,S$GLB,, | Performed by: PHYSICIAN ASSISTANT

## 2019-04-01 PROCEDURE — 3079F DIAST BP 80-89 MM HG: CPT | Mod: S$GLB,,, | Performed by: PHYSICIAN ASSISTANT

## 2019-04-01 PROCEDURE — 99999 PR PBB SHADOW E&M-EST. PATIENT-LVL III: ICD-10-PCS | Mod: PBBFAC,,, | Performed by: PHYSICIAN ASSISTANT

## 2019-04-01 RX ORDER — ACETAMINOPHEN AND CODEINE PHOSPHATE 300; 30 MG/1; MG/1
TABLET ORAL
COMMUNITY
End: 2019-04-01 | Stop reason: SDUPTHER

## 2019-04-01 RX ORDER — METHOTREXATE 2.5 MG/1
TABLET ORAL
Qty: 32 TABLET | Refills: 6 | Status: SHIPPED | OUTPATIENT
Start: 2019-04-01 | End: 2019-04-16 | Stop reason: SDUPTHER

## 2019-04-01 RX ORDER — CYANOCOBALAMIN 1000 UG/ML
1000 INJECTION, SOLUTION INTRAMUSCULAR; SUBCUTANEOUS
Status: COMPLETED | OUTPATIENT
Start: 2019-04-01 | End: 2019-04-01

## 2019-04-01 RX ORDER — TRAMADOL HYDROCHLORIDE 50 MG/1
TABLET ORAL
COMMUNITY
End: 2019-04-01

## 2019-04-01 RX ORDER — METOPROLOL TARTRATE 25 MG/1
25 TABLET, FILM COATED ORAL
COMMUNITY
Start: 2019-03-11 | End: 2019-04-01 | Stop reason: SDUPTHER

## 2019-04-01 RX ORDER — METHYLPREDNISOLONE ACETATE 80 MG/ML
80 INJECTION, SUSPENSION INTRA-ARTICULAR; INTRALESIONAL; INTRAMUSCULAR; SOFT TISSUE
Status: COMPLETED | OUTPATIENT
Start: 2019-04-01 | End: 2019-04-01

## 2019-04-01 RX ORDER — KETOROLAC TROMETHAMINE 30 MG/ML
60 INJECTION, SOLUTION INTRAMUSCULAR; INTRAVENOUS
Status: COMPLETED | OUTPATIENT
Start: 2019-04-01 | End: 2019-04-01

## 2019-04-01 RX ORDER — FOLIC ACID 1 MG/1
1000 TABLET ORAL DAILY
Qty: 90 TABLET | Refills: 3 | Status: SHIPPED | OUTPATIENT
Start: 2019-04-01 | End: 2019-04-16 | Stop reason: SDUPTHER

## 2019-04-01 RX ORDER — ACETAMINOPHEN AND CODEINE PHOSPHATE 300; 30 MG/1; MG/1
1 TABLET ORAL EVERY 6 HOURS PRN
Qty: 120 TABLET | Refills: 2 | Status: SHIPPED | OUTPATIENT
Start: 2019-04-01 | End: 2019-04-16 | Stop reason: SDUPTHER

## 2019-04-01 RX ADMIN — KETOROLAC TROMETHAMINE 60 MG: 30 INJECTION, SOLUTION INTRAMUSCULAR; INTRAVENOUS at 02:04

## 2019-04-01 RX ADMIN — CYANOCOBALAMIN 1000 MCG: 1000 INJECTION, SOLUTION INTRAMUSCULAR; SUBCUTANEOUS at 02:04

## 2019-04-01 RX ADMIN — METHYLPREDNISOLONE ACETATE 80 MG: 80 INJECTION, SUSPENSION INTRA-ARTICULAR; INTRALESIONAL; INTRAMUSCULAR; SOFT TISSUE at 02:04

## 2019-04-01 NOTE — PROGRESS NOTES
Administered 1 cc ( 1000 mcg/ml ) of b12 to the right upper outer gluteal. Informed of s/s to report verbalized understanding. No adverse reactions noted.    Lot # [id number]  Expiration sep 20    Administered 1 cc ( 80 mg/ml ) of depomedrol to the right upper outer gluteal. Informed of s/s to report verbalized understanding. No adverse reactions noted.    Lot # [id number]  Expiration 03/20    Administered 2 cc ( 30 mg/ml ) of toradol to the left upper outer gluteal. Informed of s/s to report verbalized understanding. No adverse reactions noted.    Lot # [id number]  Expiration 11/2020

## 2019-04-01 NOTE — PROGRESS NOTES
Subjective:       Patient ID: Mary Reyes is a 68 y.o. female.    Chief Complaint: Osteoarthritis    Mrs. Reyes is a 68 year old female who presents to clinic for follow up on psoriatic arthritis, osteoarthritis, and osteoporosis. She is a new patient to me and was last seen in clinic in 12/2017. She has been doing fair since her last visit. She was hospitalized in September for reverse shoulder and in December for chest pain (cardiac work up was negative). She received synvisc in the the L knee with Ortho 2 weeks ago. She held MTX following her surgery, but has been on it consistently x 3 months. She reports morning stiffness typically 1 hour. She complains of pain involving her hands, knees, ankles, and feet. She also suffers with intermittent neck pain. Psoriasis is minimal on the dorsum of her feet. She complains of increased stress and anxiety recently and SNRI in the past have caused significantly elevated BP so she is not interested in any treatment for this.    Current treatment:  1. MTX 8 tabs weekly  2. Folic acid      Review of Systems   Constitutional: Positive for activity change. Negative for appetite change, chills, fatigue and fever.   Eyes: Negative for visual disturbance.   Respiratory: Negative for cough and shortness of breath.    Cardiovascular: Negative for chest pain, palpitations and leg swelling.   Gastrointestinal: Negative for abdominal pain, constipation, diarrhea, nausea and vomiting.   Musculoskeletal: Positive for arthralgias, back pain, joint swelling, myalgias, neck pain and neck stiffness.   Skin: Positive for rash.   Allergic/Immunologic: Positive for immunocompromised state.   Neurological: Negative for dizziness, weakness, light-headedness and headaches.   Psychiatric/Behavioral: Positive for dysphoric mood. The patient is nervous/anxious.          Objective:     Vitals:    04/01/19 1555   BP: (!) 154/88   Pulse:        Past Medical History:   Diagnosis Date    Anxiety      Asthma     as a child    General anesthetics causing adverse effect in therapeutic use     difficult to wake    GERD (gastroesophageal reflux disease)     Hyperlipidemia     Hypertension     MVP (mitral valve prolapse)     Psoriatic arthritis     Rheumatoid arthritis      Past Surgical History:   Procedure Laterality Date    ARTHROPLASTY, SHOULDER, TOTAL, REVERSE Left 9/21/2018    Performed by [facility] at [facility] OR    HYSTERECTOMY      TONSILLECTOMY            Physical Exam   Constitutional: She is well-developed, well-nourished, and in no distress.   Eyes: Right conjunctiva is not injected. Left conjunctiva is not injected. Right eye exhibits normal extraocular motion. Left eye exhibits normal extraocular motion.   Neck: No JVD present. No thyromegaly present.   Cardiovascular: Normal rate and regular rhythm.  Exam reveals no decreased pulses.    Pulmonary/Chest: She has no wheezes. She has no rhonchi. She has no rales.       Right Side Rheumatological Exam     Examination finds the shoulder, elbow, wrist, 1st PIP, 1st MCP, 2nd MCP, 3rd MCP, 4th MCP and 5th MCP normal.    The patient is tender to palpation of the knee, 2nd PIP, 3rd PIP, 4th PIP, 5th PIP, 1st MTP, 2nd MTP, 3rd MTP, 4th MTP and 5th MTP    She has swelling of the knee, 2nd PIP, 3rd PIP, 4th PIP and 5th PIP    Left Side Rheumatological Exam     Examination finds the shoulder, elbow, 3rd PIP, 3rd MCP, 4th PIP, 4th MCP, 5th PIP and 5th MCP normal.    The patient is tender to palpation of the wrist, knee, 1st PIP, 1st MCP, 2nd PIP, 2nd MCP, 1st MTP, 2nd MTP, 3rd MTP, 4th MTP and 5th MTP.    She has swelling of the wrist, knee, 1st PIP, 1st MCP, 2nd PIP and 2nd MCP      Lymphadenopathy:     She has no cervical adenopathy.   Neurological: Gait normal.   Skin: Rash (scaling on the feet) noted.     Psychiatric: Affect normal. She exhibits a depressed mood.         Component      Latest Ref Rng & Units 9/10/2018   Sodium      136 - 145  mmol/L 140   Potassium      3.5 - 5.1 mmol/L 4.2   Chloride      95 - 110 mmol/L 107   CO2      22 - 31 mmol/L 21 (L)   Glucose      70 - 110 mg/dL 140 (H)   BUN, Bld      7 - 18 mg/dL 13   Creatinine      0.50 - 1.40 mg/dL 0.65   Calcium      8.4 - 10.2 mg/dL 10.0   Anion Gap      8 - 16 mmol/L 12   eGFR if African American      >60 mL/min/1.73 m:2 >60   eGFR if non African American      >60 mL/min/1.73 m:2 >60   WBC      3.90 - 12.70 K/uL 6.64   RBC      4.00 - 5.40 M/uL 4.79   Hemoglobin      12.0 - 16.0 g/dL 13.7   Hematocrit      37.0 - 48.5 % 42.0   MCV      82 - 98 fL 88   MCH      27.0 - 31.0 pg 28.6   MCHC      32.0 - 36.0 g/dL 32.6   RDW      11.5 - 14.5 % 12.4   Platelets      150 - 350 K/uL 296   MPV      9.2 - 12.9 fL 10.9     Assessment:       1. Psoriatic arthritis    2. Primary osteoarthritis involving multiple joints    3. Methotrexate, long term, current use    4. Fibromyalgia    5. Osteoporosis, unspecified osteoporosis type, unspecified pathological fracture presence    6. Essential hypertension            Plan:       Psoriatic arthritis  -     CBC auto differential; Future; Expected date: 04/01/2019  -     Comprehensive metabolic panel; Future; Expected date: 04/01/2019  -     C-reactive protein; Future; Expected date: 04/01/2019  -     Sedimentation rate; Future; Expected date: 04/01/2019  -     Cyclic citrul peptide antibody, IgG; Future; Expected date: 04/01/2019  -     Rheumatoid factor; Future; Expected date: 04/01/2019  -     ketorolac injection 60 mg  -     methylPREDNISolone acetate injection 80 mg  -     cyanocobalamin injection 1,000 mcg  -     methotrexate 2.5 MG Tab; 8 tabs PO once weekly  Dispense: 32 tablet; Refill: 6  -     folic acid (FOLVITE) 1 MG tablet; Take 1 tablet (1,000 mcg total) by mouth once daily.  Dispense: 90 tablet; Refill: 3  -     Comprehensive metabolic panel; Future; Expected date: 04/01/2019  -     C-reactive protein; Future; Expected date: 04/01/2019  -      Sedimentation rate, automated; Future; Expected date: 04/01/2019  -     CBC auto differential; Future; Expected date: 04/01/2019    Primary osteoarthritis involving multiple joints    Methotrexate, long term, current use    Fibromyalgia    Osteoporosis, unspecified osteoporosis type, unspecified pathological fracture presence    Essential hypertension        Assessment:  68 year old female with  Psoriatic arthritis, psoriasis on MTX, fibromyalgia, osteoarthritis, osteoporosis    Plan:  1. Toradol 60 mg, depomedrol 80 mg, b12 1000 mcg given today in clinic  2. Continue MTX 8 tabs PO once weekly and folic acid 1 mg daily  3. Refill tylenol #3 sent to Dr. Asher. Pain contract signed today  4. Follow up with Ortho for synvisc injections  5. Recommend Dermatology f/u with hx of BCC, pt refused.   6. Recommended counseling for depression/anxiety, pt refused.   7. Follow up with PCP for uncontrolled hypertension (pt reports hx of white coat HTN).    Follow up: DUE FOR LABS NOW. 3-4 months with Dr. Asher with routine labs prior

## 2019-04-16 NOTE — TELEPHONE ENCOUNTER
----- Message from Quincy Downing sent at 4/16/2019  4:20 PM CDT -----  Contact: Patient  Type: Needs Medical Advice    Who Called:  Patient  Pharmacy name and phone #:    [id number] - [place] - [address]  [address]  [place] [address]  Phone: [phone] Fax: [phone]  Best Call Back Number: [phone]  Additional Information: Patient's medication was sent to wrong pharmacy. Please advise when complete. She is completely out of medication

## 2019-04-16 NOTE — TELEPHONE ENCOUNTER
----- Message from Derik Bah sent at 4/16/2019  3:20 PM CDT -----  Contact: pt  Type:  Pharmacy Calling to Clarify an RX    Name of Caller:  pt  [facility] Name:    [facility]   Phone: [phone]    Prescription Name:      acetaminophen-codeine 300-30mg (TYLENOL #3) 300-30 mg Tab  methotrexate 2.5 MG Tab  folic acid (FOLVITE) 1 MG tablet    What do they need to clarify?:  Pt states these were sent to the wrong pharmacy and needs them resent to the pharmacy listed above.  Best Call Back Number:  [phone]  Additional Information:  Please call pt to advise

## 2019-04-17 RX ORDER — FOLIC ACID 1 MG/1
1000 TABLET ORAL DAILY
Qty: 90 TABLET | Refills: 3 | Status: SHIPPED | OUTPATIENT
Start: 2019-04-17 | End: 2019-10-12 | Stop reason: SDUPTHER

## 2019-04-17 RX ORDER — METHOTREXATE 2.5 MG/1
TABLET ORAL
Qty: 32 TABLET | Refills: 6 | Status: SHIPPED | OUTPATIENT
Start: 2019-04-17 | End: 2019-08-22 | Stop reason: SDUPTHER

## 2019-04-17 RX ORDER — ACETAMINOPHEN AND CODEINE PHOSPHATE 300; 30 MG/1; MG/1
1 TABLET ORAL EVERY 6 HOURS PRN
Qty: 120 TABLET | Refills: 2 | Status: SHIPPED | OUTPATIENT
Start: 2019-04-17 | End: 2019-09-01 | Stop reason: SDUPTHER

## 2019-04-17 NOTE — TELEPHONE ENCOUNTER
----- Message from Lisa Alcaraz sent at 4/17/2019  8:05 AM CDT -----  Contact: patient  Type: Needs Medical Advice    Who Called:  patient  Best Call Back Number: [phone]  Additional Information: would like for the nurse to give her a call back

## 2019-04-17 NOTE — TELEPHONE ENCOUNTER
Contacted patient regarding medications. Patient informed office that she was able to fill her folic acid and methotrexate. The pharmacy only gave her 28 tablets of Tylenol # 3. Patient stated the prescriptions were sent to [facility] by mistake has to use [facility]. Patient stated [facility]. Informed patient that will inform Dr Asher to re send scripts. Patient verbalized understanding.

## 2019-08-22 ENCOUNTER — OFFICE VISIT (OUTPATIENT)
Dept: RHEUMATOLOGY | Facility: CLINIC | Age: 69
End: 2019-08-22
Payer: MEDICARE

## 2019-08-22 VITALS
BODY MASS INDEX: 30.82 KG/M2 | HEIGHT: 65 IN | WEIGHT: 185 LBS | DIASTOLIC BLOOD PRESSURE: 82 MMHG | SYSTOLIC BLOOD PRESSURE: 178 MMHG | HEART RATE: 65 BPM

## 2019-08-22 DIAGNOSIS — Z79.631 METHOTREXATE, LONG TERM, CURRENT USE: ICD-10-CM

## 2019-08-22 DIAGNOSIS — M15.9 PRIMARY OSTEOARTHRITIS INVOLVING MULTIPLE JOINTS: Primary | ICD-10-CM

## 2019-08-22 DIAGNOSIS — M79.7 FIBROMYALGIA: ICD-10-CM

## 2019-08-22 DIAGNOSIS — F41.9 ANXIETY DISORDER, UNSPECIFIED TYPE: ICD-10-CM

## 2019-08-22 DIAGNOSIS — L40.50 PSORIATIC ARTHRITIS: ICD-10-CM

## 2019-08-22 PROCEDURE — 3079F PR MOST RECENT DIASTOLIC BLOOD PRESSURE 80-89 MM HG: ICD-10-PCS | Mod: S$GLB,,, | Performed by: INTERNAL MEDICINE

## 2019-08-22 PROCEDURE — 3079F DIAST BP 80-89 MM HG: CPT | Mod: S$GLB,,, | Performed by: INTERNAL MEDICINE

## 2019-08-22 PROCEDURE — 99999 PR PBB SHADOW E&M-EST. PATIENT-LVL III: CPT | Mod: PBBFAC,,, | Performed by: INTERNAL MEDICINE

## 2019-08-22 PROCEDURE — 96372 THER/PROPH/DIAG INJ SC/IM: CPT | Mod: 59,S$GLB,, | Performed by: INTERNAL MEDICINE

## 2019-08-22 PROCEDURE — 1101F PT FALLS ASSESS-DOCD LE1/YR: CPT | Mod: S$GLB,,, | Performed by: INTERNAL MEDICINE

## 2019-08-22 PROCEDURE — 3077F PR MOST RECENT SYSTOLIC BLOOD PRESSURE >= 140 MM HG: ICD-10-PCS | Mod: S$GLB,,, | Performed by: INTERNAL MEDICINE

## 2019-08-22 PROCEDURE — 3077F SYST BP >= 140 MM HG: CPT | Mod: S$GLB,,, | Performed by: INTERNAL MEDICINE

## 2019-08-22 PROCEDURE — 99999 PR PBB SHADOW E&M-EST. PATIENT-LVL III: ICD-10-PCS | Mod: PBBFAC,,, | Performed by: INTERNAL MEDICINE

## 2019-08-22 PROCEDURE — 99215 OFFICE O/P EST HI 40 MIN: CPT | Mod: 25,S$GLB,, | Performed by: INTERNAL MEDICINE

## 2019-08-22 PROCEDURE — 96372 PR INJECTION,THERAP/PROPH/DIAG2ST, IM OR SUBCUT: ICD-10-PCS | Mod: 59,S$GLB,, | Performed by: INTERNAL MEDICINE

## 2019-08-22 PROCEDURE — 99215 PR OFFICE/OUTPT VISIT, EST, LEVL V, 40-54 MIN: ICD-10-PCS | Mod: 25,S$GLB,, | Performed by: INTERNAL MEDICINE

## 2019-08-22 PROCEDURE — 1101F PR PT FALLS ASSESS DOC 0-1 FALLS W/OUT INJ PAST YR: ICD-10-PCS | Mod: S$GLB,,, | Performed by: INTERNAL MEDICINE

## 2019-08-22 RX ORDER — METOPROLOL TARTRATE 25 MG/1
TABLET, FILM COATED ORAL
COMMUNITY
Start: 2019-08-14 | End: 2020-10-22 | Stop reason: SDUPTHER

## 2019-08-22 RX ORDER — LORAZEPAM 0.5 MG/1
0.5 TABLET ORAL EVERY 12 HOURS PRN
Qty: 60 TABLET | Refills: 3 | Status: SHIPPED | OUTPATIENT
Start: 2019-08-22 | End: 2019-12-10 | Stop reason: SDUPTHER

## 2019-08-22 RX ORDER — ACETAMINOPHEN AND CODEINE PHOSPHATE 300; 30 MG/1; MG/1
TABLET ORAL
COMMUNITY
Start: 2019-08-01 | End: 2019-08-22 | Stop reason: SDUPTHER

## 2019-08-22 RX ORDER — KETOROLAC TROMETHAMINE 30 MG/ML
60 INJECTION, SOLUTION INTRAMUSCULAR; INTRAVENOUS
Status: COMPLETED | OUTPATIENT
Start: 2019-08-22 | End: 2019-08-22

## 2019-08-22 RX ORDER — ACETAMINOPHEN AND CODEINE PHOSPHATE 300; 30 MG/1; MG/1
1 TABLET ORAL EVERY 6 HOURS PRN
Qty: 120 TABLET | Refills: 3 | Status: SHIPPED | OUTPATIENT
Start: 2019-08-22 | End: 2019-09-21

## 2019-08-22 RX ORDER — METHOTREXATE 2.5 MG/1
TABLET ORAL
Qty: 32 TABLET | Refills: 6 | Status: SHIPPED | OUTPATIENT
Start: 2019-08-22 | End: 2019-12-10 | Stop reason: SDUPTHER

## 2019-08-22 RX ORDER — METHYLPREDNISOLONE ACETATE 80 MG/ML
80 INJECTION, SUSPENSION INTRA-ARTICULAR; INTRALESIONAL; INTRAMUSCULAR; SOFT TISSUE
Status: COMPLETED | OUTPATIENT
Start: 2019-08-22 | End: 2019-08-22

## 2019-08-22 RX ORDER — GLUCOSAM/CHONDRO/HERB 149/HYAL 750-100 MG
2 TABLET ORAL
COMMUNITY
Start: 2019-03-11 | End: 2022-01-11

## 2019-08-22 RX ORDER — LORAZEPAM 0.5 MG/1
0.5 TABLET ORAL EVERY 12 HOURS PRN
Qty: 30 TABLET | Refills: 3 | Status: CANCELLED | OUTPATIENT
Start: 2019-08-22

## 2019-08-22 RX ADMIN — METHYLPREDNISOLONE ACETATE 80 MG: 80 INJECTION, SUSPENSION INTRA-ARTICULAR; INTRALESIONAL; INTRAMUSCULAR; SOFT TISSUE at 09:08

## 2019-08-22 RX ADMIN — KETOROLAC TROMETHAMINE 60 MG: 30 INJECTION, SOLUTION INTRAMUSCULAR; INTRAVENOUS at 09:08

## 2019-08-22 ASSESSMENT — ROUTINE ASSESSMENT OF PATIENT INDEX DATA (RAPID3)
PATIENT GLOBAL ASSESSMENT SCORE: 3
PSYCHOLOGICAL DISTRESS SCORE: 2.2
TOTAL RAPID3 SCORE: 3.11
FATIGUE SCORE: 1.1
PAIN SCORE: 4
MDHAQ FUNCTION SCORE: .7

## 2019-08-22 NOTE — PROGRESS NOTES
Administered 2 cc ( 30 mg/ml ) of toradol to the left upper outer gluteal. Informed of s/s to report verbalized understanding. No adverse reactions noted.    Lot # [id number]  Expiration 1 jul 2020    Administered 1 cc ( 80 mg/ml ) of depomedrol to the right upper outer gluteal. Informed of s/s to report verbalized understanding. No adverse reactions noted.    Lot # [id number]  Expiration 10/20

## 2019-08-22 NOTE — PROGRESS NOTES
Subjective:       Patient ID: Mary Reyes is a 68 y.o. female.    Chief Complaint: Psoriatic Arthritis and Osteoarthritis (primary osteoarthritis involving multiple joints)    Follow up: 68 year old female  psoriatic arthritis, osteoarthritis, and osteoporosis. visit.  She reports morning stiffness typically 1 hour. She complains of pain involving her hands, knees, ankles, and feet. She also suffers with intermittent neck pain. Psoriasis is minimal on the dorsum of her feet. Pt has neuropathy  L arm,  Current treatment:  1. MTX 8 tabs weekly  2. Folic acid      Osteoarthritis   Associated symptoms include arthralgias, joint swelling, myalgias, neck pain and a rash. Pertinent negatives include no abdominal pain, chest pain, chills, coughing, fatigue, fever, headaches, nausea, vomiting or weakness.     Review of Systems   Constitutional: Positive for activity change. Negative for appetite change, chills, fatigue and fever.   Eyes: Negative for visual disturbance.   Respiratory: Negative for cough and shortness of breath.    Cardiovascular: Negative for chest pain, palpitations and leg swelling.   Gastrointestinal: Negative for abdominal pain, constipation, diarrhea, nausea and vomiting.   Musculoskeletal: Positive for arthralgias, back pain, joint swelling, myalgias, neck pain and neck stiffness.   Skin: Positive for rash.   Allergic/Immunologic: Positive for immunocompromised state.   Neurological: Negative for dizziness, weakness, light-headedness and headaches.   Psychiatric/Behavioral: Positive for dysphoric mood. The patient is nervous/anxious.          Objective:     Vitals:    08/22/19 0814   BP: (!) 178/82   Pulse: 65              Physical Exam   Constitutional: She is well-developed, well-nourished, and in no distress.   Eyes: Right conjunctiva is not injected. Left conjunctiva is not injected. Right eye exhibits normal extraocular motion. Left eye exhibits normal extraocular motion.   Neck: No JVD  present. No thyromegaly present.   Cardiovascular: Normal rate and regular rhythm.  Exam reveals no decreased pulses.    Pulmonary/Chest: She has no wheezes. She has no rhonchi. She has no rales.       Right Side Rheumatological Exam     Examination finds the shoulder, elbow, wrist, 1st PIP, 1st MCP, 2nd MCP, 3rd MCP, 4th MCP and 5th MCP normal.    The patient is tender to palpation of the knee, 2nd PIP, 3rd PIP, 4th PIP, 5th PIP, 1st MTP, 2nd MTP, 3rd MTP, 4th MTP and 5th MTP    She has swelling of the knee, 2nd PIP, 3rd PIP, 4th PIP and 5th PIP    Left Side Rheumatological Exam     Examination finds the shoulder, elbow, 3rd PIP, 3rd MCP, 4th PIP, 4th MCP, 5th PIP and 5th MCP normal.    The patient is tender to palpation of the wrist, knee, 1st PIP, 1st MCP, 2nd PIP, 2nd MCP, 1st MTP, 2nd MTP, 3rd MTP, 4th MTP and 5th MTP.    She has swelling of the wrist, knee, 1st PIP, 1st MCP, 2nd PIP and 2nd MCP      Lymphadenopathy:     She has no cervical adenopathy.   Neurological: Gait normal.   Skin: Rash (scaling on the feet) noted.     Psychiatric: Affect normal. She exhibits a depressed mood.         URINALYSIS WITH REFLEX MICROSCOPIC (08/16/2019 8:05 AM CDT)  URINALYSIS WITH REFLEX MICROSCOPIC (08/16/2019 8:05 AM CDT)   Component Value Ref Range Performed At Pathologist Signature   COLOR UA Yellow Pale to dark yellow [facility]     CLARITY UA Clear Clear [facility]     SPECIFIC GRAVITY UA 1.013 1.010 - 1.030 [facility]     PH UA 6.0 5.0 - 8.0 [facility]     LEUKOCYTE ESTERASE UA Negative Negative [facility]     NITRITE UA Negative Negative [facility]     PROTEIN UA Negative Negative [facility]     GLUCOSE UA Negative Negative [facility]     KETONES UA Negative Negative [facility]     UROBILINOGEN UA Normal <2.0 mg/dL [facility]     BILIRUBIN UA Negative Negative  [facility]     BLOOD UA Negative Negative [facility]       URINALYSIS WITH REFLEX MICROSCOPIC (08/16/2019 8:05 AM CDT)   Specimen   Urine     URINALYSIS WITH REFLEX MICROSCOPIC (08/16/2019 8:05 AM CDT)   Performing Organization Address City/State/[facility] Phone Number   [facility]   CLIA #[id number], [address] [place] [address]      Back to top of Lab Results       LIPID PANEL (08/16/2019 8:01 AM CDT)  LIPID PANEL (08/16/2019 8:01 AM CDT)   Component Value Ref Range Performed At Pathologist Signature   CHOLESTEROL 155 <200 mg/dL [facility]     TRIGLYCERIDE 124 <=150 mg/dL [facility]     HDL 48 40 - 59 mg/dL [facility]     LDL CALCULATED 82 <130 mg/dL [facility]     NON-HDL CHOLESTEROL 107 <130 mg/dL [facility]     CHOL/HDL RATIO 3.2   [facility]       LIPID PANEL (08/16/2019 8:01 AM CDT)   Specimen   Blood     LIPID PANEL (08/16/2019 8:01 AM CDT)   Narrative Performed At      Coronary Heart Disease Risk    ---------------------------------                         Male  Female    Average Risk          5.0    4.4     2 x Average Risk      9.6    7.1    3 x Average Risk     23.4   11.0   [facility]       LIPID PANEL (08/16/2019 8:01 AM CDT)   Performing Organization Address City/[facility] [place] [facility] Phone Number   [facility]   CLIA #[id number], [address] [place] [address]      Back to top of Lab Results       T4 FREE (08/16/2019 8:01 AM CDT)  T4 FREE (08/16/2019 8:01 AM CDT)   Component Value Ref Range Performed At Pathologist Signature   T4 FREE 0.88 0.60 - 1.15 ng/dL [facility]       T4 FREE (08/16/2019 8:01 AM CDT)   Specimen   Blood     T4 FREE (08/16/2019 8:01 AM CDT)   Performing Organization Address City/State/[facility] [place] [facility] Phone Number   [facility]   CLIA #[id number], [address] [place]  [address]      Back to top of Lab Results       T3 FREE (08/16/2019 8:01 AM CDT)  T3 FREE (08/16/2019 8:01 AM CDT)   Component Value Ref Range Performed At [facility]   T3 FREE 3.1 2.4 - 6.8 pg/mL [facility]       T3 FREE (08/16/2019 8:01 AM CDT)   Specimen   Blood     T3 FREE (08/16/2019 8:01 AM CDT)   Performing Organization Address City/[facility] Phone Number   [facility]   CLIA #[id number], [address] [place] [address]      Back to top of Lab Results       TSH (08/16/2019 8:01 AM CDT)  TSH (08/16/2019 8:01 AM CDT)   Component Value Ref Range Performed At [facility]   TSH 2.04 0.50 - 5.00 uIU/mL [facility]       RHEUMATOID FACTOR (08/16/2019 8:01 AM CDT)  RHEUMATOID FACTOR (08/16/2019 8:01 AM CDT)   Component Value Ref Range Performed At [facility]   RHEUMATOID FACTOR <15  Comment:   Perform at [facility]  [address] [place] [address]  [phone] - 30 IU/mL [facility]       RHEUMATOID FACTOR (08/16/2019 8:01 AM CDT)   Specimen   Blood     RHEUMATOID FACTOR (08/16/2019 8:01 AM CDT)   Performing Organization Address City/[facility] Phone Number   [facility]             Back to top of Lab Results       CYCLIC CITRULLINATED PEPTIDE AB IGG (08/16/2019 8:01 AM CDT)  CYCLIC CITRULLINATED PEPTIDE AB IGG (08/16/2019 8:01 AM CDT)   Component Value Ref Range Performed At [facility]   CYCLIC CITRULLINATED PEPTIDE AB IGG <15.6  Comment:   Test Performed by:  [facility]  [address] [place] [address] <20.0 (Negative) U [facility]       CYCLIC CITRULLINATED PEPTIDE AB IGG (08/16/2019 8:01 AM CDT)   Specimen   Blood     CYCLIC CITRULLINATED PEPTIDE AB IGG (08/16/2019 8:01 AM CDT)   Performing Organization Address City/[facility] Phone Number   [facility]         [phone]     [facility]             Back to top of Lab Results       SEDIMENTATION RATE (08/16/2019 8:01 AM CDT)  SEDIMENTATION RATE (08/16/2019 8:01 AM CDT)   Component Value Ref Range Performed At Pathologist [facility]   ESR (SEDIMENTATION RATE) 17 <40 mm/Hr [facility]       SEDIMENTATION RATE (08/16/2019 8:01 AM CDT)   Specimen   Blood     SEDIMENTATION RATE (08/16/2019 8:01 AM CDT)   Performing Organization Address City/State/Zipcode Phone Number   [facility]   CLIA #[id number], [address] [place] [address]      Back to top of Lab Results       C-REACTIVE PROTEIN (08/16/2019 8:01 AM CDT)  C-REACTIVE PROTEIN (08/16/2019 8:01 AM CDT)   Component Value Ref Range Performed At Pathologist Signature   CRP <5.0  Comment:     Please note new unit of measure and reference range for CRP starting June 19, 2016. <=9.0 mg/L [facility]       C-REACTIVE PROTEIN (08/16/2019 8:01 AM CDT)   Specimen   Blood     C-REACTIVE PROTEIN (08/16/2019 8:01 AM CDT)   Performing Organization Address City/[facility] Phone Number   [facility]   CLIA #[id number], [address] [place] [address]      Back to top of Lab Results       COMPREHENSIVE METABOLIC PANEL (08/16/2019 8:01 AM CDT)  COMPREHENSIVE METABOLIC PANEL (08/16/2019 8:01 AM CDT)   Component Value Ref Range Performed At Pathologist [facility]   SODIUM 143 135 - 146 mmol/L [facility]     POTASSIUM 4.1 3.6 - 5.2 mmol/L [facility]     CHLORIDE 105 96 - 110 mmol/L [facility]     CO2 27 24 - 32 mmol/L [facility]     CALCIUM 9.3 8.4 - 10.3 mg/dL [facility]     BUN 16 7 - 25 mg/dL [facility]     CREATININE 0.62 0.50 - 1.10 mg/dL [facility]     GLUCOSE 114 (H) 65 - 99 mg/dL [facility]     TOTAL PROTEIN 6.8 6.0 - 8.0 g/dL [facility]     ALBUMIN  4.0 3.4 - 5.0 g/dL [facility]     BILIRUBIN TOTAL 0.8 0.0 - 1.3 mg/dL [facility]     ALKALINE PHOSPHATASE 76 20 - 120 U/L [facility]     AST 20 <=45 U/L [facility]     ALT 27 <=46 U/L [facility]     GFR >60  Comment:     eGFR has not been validated for use in the elderly (> 70 years of age), pregnant women, patients with serious co-morbid conditions, or persons with extremes of body size or muscle mass and should also be interpreted with caution in patients with acute kidney failure, dialysis dependent patients, patients reporting exceptional dietary intake (e.g. vegetarian diet, high protein diets, creatine supplementation), and patients with severe liver disease.     Based on National Kidney Disease Education Program     ** If patient is , please refer to the GFR  result.       >=60 mL/min/1.73 sq meter [facility]     GFR, AFRICAN AMERICAN >60 >=60 mL/min/1.73 sq meter [facility]       COMPREHENSIVE METABOLIC PANEL (08/16/2019 8:01 AM CDT)   Specimen   Blood     COMPREHENSIVE METABOLIC PANEL (08/16/2019 8:01 AM CDT)   Performing Organization Address City/State/Zipcode Phone Number   [facility]   IA #[id number], [address] [place] [address]      Back to top of Lab Results       CBC WITH DIFFERENTIAL (08/16/2019 8:01 AM CDT)  CBC WITH DIFFERENTIAL (08/16/2019 8:01 AM CDT)   Component Value Ref Range Performed At Pathologist Signature   WBC 6.7 4.5 - 11.0 K/uL [facility]     RBC 4.49 4.00 - 5.20 M/uL [facility]     HEMOGLOBIN 13.4 12.0 - 16.0 g/dL [facility]     HEMATOCRIT 40.2 35.0 - 45.0 % [facility]     MCV 89.5 80.0 - 100.0 fL [facility]     MCH 29.8 26.0 - 34.0 pg [facility]     MCHC 33.3 31.0 - 37.0 g/dL [facility]     RDW 13.8 11.5  - 14.5 % [facility]     PLATELETS 327 130 - 400 K/uL [facility]     MPV 7.7 7.4 - 10.4 fL [facility]     NEUTROPHILS 40 % [facility]     LYMPHOCYTES 50 % [facility]     MONOCYTES 7 % [facility]     EOSINOPHILS 2 % [facility]     BASOPHILS 1 % [facility]     NEUTROPHIL ABSOLUTE 2.70 1.80 - 8.00 K/uL [facility]     LYMPHOCYTE ABSOLUTE 3.40 1.10 - 5.00 K/uL [facility]     MONOCYTE ABSOLUTE 0.50 0.20 - 1.10 K/uL [facility]     EOSINOPHIL ABSOLUTE 0.10 0.00 - 0.60 K/uL [facility]     BASOPHILS ABSOLUTE 0.10 0.00 - 0.20 K/uL [facility]           Back to top of Lab Results  Patient is labs were done in [facility] and they were reviewed by myself    Assessment:       1. Primary osteoarthritis involving multiple joints    2. Psoriatic arthritis    3. Methotrexate, long term, current use    4. Fibromyalgia    5. Anxiety disorder, unspecified type            Plan:       Primary osteoarthritis involving multiple joints  -     methotrexate 2.5 MG Tab; 8 tabs PO once weekly  Dispense: 32 tablet; Refill: 6  -     acetaminophen-codeine 300-30mg (TYLENOL #3) 300-30 mg Tab; Take 1 tablet by mouth every 6 (six) hours as needed. Chronic pain >7 days medically necessary override dx code G89.4  Dispense: 120 tablet; Refill: 3  -     folic acid-vit B6-vit B12 (FOLBEE) 2.5-25-1 mg Tab; Take 1 tablet by mouth once daily.  Dispense: 90 each; Refill: 3  -     LORazepam (ATIVAN) 0.5 MG tablet; Take 1 tablet (0.5 mg total) by mouth every 12 (twelve) hours as needed for Anxiety.  Dispense: 60 tablet; Refill: 3  -     CBC auto differential; Future; Expected date: 08/22/2019  -     Comprehensive metabolic panel; Future; Expected date: 08/22/2019  -     C-reactive protein; Future; Expected date: 08/22/2019  -      Sedimentation rate; Future; Expected date: 08/22/2019  -     Vitamin D; Future; Expected date: 08/22/2019  -     ketorolac injection 60 mg  -     methylPREDNISolone acetate injection 80 mg    Psoriatic arthritis  -     methotrexate 2.5 MG Tab; 8 tabs PO once weekly  Dispense: 32 tablet; Refill: 6  -     acetaminophen-codeine 300-30mg (TYLENOL #3) 300-30 mg Tab; Take 1 tablet by mouth every 6 (six) hours as needed. Chronic pain >7 days medically necessary override dx code G89.4  Dispense: 120 tablet; Refill: 3  -     folic acid-vit B6-vit B12 (FOLBEE) 2.5-25-1 mg Tab; Take 1 tablet by mouth once daily.  Dispense: 90 each; Refill: 3  -     LORazepam (ATIVAN) 0.5 MG tablet; Take 1 tablet (0.5 mg total) by mouth every 12 (twelve) hours as needed for Anxiety.  Dispense: 60 tablet; Refill: 3  -     CBC auto differential; Future; Expected date: 08/22/2019  -     Comprehensive metabolic panel; Future; Expected date: 08/22/2019  -     C-reactive protein; Future; Expected date: 08/22/2019  -     Sedimentation rate; Future; Expected date: 08/22/2019  -     Vitamin D; Future; Expected date: 08/22/2019  -     ketorolac injection 60 mg  -     methylPREDNISolone acetate injection 80 mg    Methotrexate, long term, current use  -     methotrexate 2.5 MG Tab; 8 tabs PO once weekly  Dispense: 32 tablet; Refill: 6  -     acetaminophen-codeine 300-30mg (TYLENOL #3) 300-30 mg Tab; Take 1 tablet by mouth every 6 (six) hours as needed. Chronic pain >7 days medically necessary override dx code G89.4  Dispense: 120 tablet; Refill: 3  -     folic acid-vit B6-vit B12 (FOLBEE) 2.5-25-1 mg Tab; Take 1 tablet by mouth once daily.  Dispense: 90 each; Refill: 3  -     LORazepam (ATIVAN) 0.5 MG tablet; Take 1 tablet (0.5 mg total) by mouth every 12 (twelve) hours as needed for Anxiety.  Dispense: 60 tablet; Refill: 3  -     CBC auto differential; Future; Expected date: 08/22/2019  -     Comprehensive metabolic panel; Future; Expected date:  08/22/2019  -     C-reactive protein; Future; Expected date: 08/22/2019  -     Sedimentation rate; Future; Expected date: 08/22/2019  -     Vitamin D; Future; Expected date: 08/22/2019  -     ketorolac injection 60 mg  -     methylPREDNISolone acetate injection 80 mg    Fibromyalgia  -     methotrexate 2.5 MG Tab; 8 tabs PO once weekly  Dispense: 32 tablet; Refill: 6  -     acetaminophen-codeine 300-30mg (TYLENOL #3) 300-30 mg Tab; Take 1 tablet by mouth every 6 (six) hours as needed. Chronic pain >7 days medically necessary override dx code G89.4  Dispense: 120 tablet; Refill: 3  -     folic acid-vit B6-vit B12 (FOLBEE) 2.5-25-1 mg Tab; Take 1 tablet by mouth once daily.  Dispense: 90 each; Refill: 3  -     LORazepam (ATIVAN) 0.5 MG tablet; Take 1 tablet (0.5 mg total) by mouth every 12 (twelve) hours as needed for Anxiety.  Dispense: 60 tablet; Refill: 3  -     CBC auto differential; Future; Expected date: 08/22/2019  -     Comprehensive metabolic panel; Future; Expected date: 08/22/2019  -     C-reactive protein; Future; Expected date: 08/22/2019  -     Sedimentation rate; Future; Expected date: 08/22/2019  -     Vitamin D; Future; Expected date: 08/22/2019  -     ketorolac injection 60 mg  -     methylPREDNISolone acetate injection 80 mg    Anxiety disorder, unspecified type  -     LORazepam (ATIVAN) 0.5 MG tablet; Take 1 tablet (0.5 mg total) by mouth every 12 (twelve) hours as needed for Anxiety.  Dispense: 60 tablet; Refill: 3  -     CBC auto differential; Future; Expected date: 08/22/2019  -     Comprehensive metabolic panel; Future; Expected date: 08/22/2019  -     C-reactive protein; Future; Expected date: 08/22/2019  -     Sedimentation rate; Future; Expected date: 08/22/2019  -     Vitamin D; Future; Expected date: 08/22/2019  -     ketorolac injection 60 mg  -     methylPREDNISolone acetate injection 80 mg           she is on methotrexate and doing  fair patient states she was having a flare so a  steroid in a Toradol was given patient is on Tylenol with codeine and was given a Ativan prescription to take p.r.n. 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

## 2019-09-01 DIAGNOSIS — L40.50 PSORIATIC ARTHRITIS: ICD-10-CM

## 2019-09-06 RX ORDER — ACETAMINOPHEN AND CODEINE PHOSPHATE 300; 30 MG/1; MG/1
TABLET ORAL
Qty: 120 TABLET | Refills: 3 | Status: SHIPPED | OUTPATIENT
Start: 2019-09-06 | End: 2019-12-10 | Stop reason: SDUPTHER

## 2019-10-12 DIAGNOSIS — L40.50 PSORIATIC ARTHRITIS: ICD-10-CM

## 2019-10-14 RX ORDER — FOLIC ACID 1 MG/1
TABLET ORAL
Qty: 90 TABLET | Refills: 0 | Status: SHIPPED | OUTPATIENT
Start: 2019-10-14 | End: 2019-12-10 | Stop reason: SDUPTHER

## 2019-12-10 ENCOUNTER — OFFICE VISIT (OUTPATIENT)
Dept: RHEUMATOLOGY | Facility: CLINIC | Age: 69
End: 2019-12-10
Payer: MEDICARE

## 2019-12-10 VITALS
HEIGHT: 65 IN | DIASTOLIC BLOOD PRESSURE: 74 MMHG | BODY MASS INDEX: 31.16 KG/M2 | WEIGHT: 187 LBS | SYSTOLIC BLOOD PRESSURE: 161 MMHG | HEART RATE: 72 BPM

## 2019-12-10 DIAGNOSIS — M79.7 FIBROMYALGIA: ICD-10-CM

## 2019-12-10 DIAGNOSIS — J32.9 SINUSITIS, UNSPECIFIED CHRONICITY, UNSPECIFIED LOCATION: ICD-10-CM

## 2019-12-10 DIAGNOSIS — F41.9 ANXIETY DISORDER, UNSPECIFIED TYPE: ICD-10-CM

## 2019-12-10 DIAGNOSIS — G89.4 CHRONIC PAIN SYNDROME: Primary | ICD-10-CM

## 2019-12-10 DIAGNOSIS — M15.9 PRIMARY OSTEOARTHRITIS INVOLVING MULTIPLE JOINTS: ICD-10-CM

## 2019-12-10 DIAGNOSIS — Z79.631 METHOTREXATE, LONG TERM, CURRENT USE: ICD-10-CM

## 2019-12-10 DIAGNOSIS — L40.50 PSORIATIC ARTHRITIS: Primary | ICD-10-CM

## 2019-12-10 DIAGNOSIS — L40.50 PSORIATIC ARTHRITIS: ICD-10-CM

## 2019-12-10 PROCEDURE — 1101F PT FALLS ASSESS-DOCD LE1/YR: CPT | Mod: S$GLB,,, | Performed by: PHYSICIAN ASSISTANT

## 2019-12-10 PROCEDURE — 3077F SYST BP >= 140 MM HG: CPT | Mod: S$GLB,,, | Performed by: PHYSICIAN ASSISTANT

## 2019-12-10 PROCEDURE — 96372 PR INJECTION,THERAP/PROPH/DIAG2ST, IM OR SUBCUT: ICD-10-PCS | Mod: 59,S$GLB,, | Performed by: PHYSICIAN ASSISTANT

## 2019-12-10 PROCEDURE — 99214 OFFICE O/P EST MOD 30 MIN: CPT | Mod: 25,S$GLB,, | Performed by: PHYSICIAN ASSISTANT

## 2019-12-10 PROCEDURE — 99999 PR PBB SHADOW E&M-EST. PATIENT-LVL II: ICD-10-PCS | Mod: PBBFAC,,, | Performed by: PHYSICIAN ASSISTANT

## 2019-12-10 PROCEDURE — 1159F PR MEDICATION LIST DOCUMENTED IN MEDICAL RECORD: ICD-10-PCS | Mod: S$GLB,,, | Performed by: PHYSICIAN ASSISTANT

## 2019-12-10 PROCEDURE — 1125F AMNT PAIN NOTED PAIN PRSNT: CPT | Mod: S$GLB,,, | Performed by: PHYSICIAN ASSISTANT

## 2019-12-10 PROCEDURE — 99999 PR PBB SHADOW E&M-EST. PATIENT-LVL II: CPT | Mod: PBBFAC,,, | Performed by: PHYSICIAN ASSISTANT

## 2019-12-10 PROCEDURE — 3077F PR MOST RECENT SYSTOLIC BLOOD PRESSURE >= 140 MM HG: ICD-10-PCS | Mod: S$GLB,,, | Performed by: PHYSICIAN ASSISTANT

## 2019-12-10 PROCEDURE — 3078F PR MOST RECENT DIASTOLIC BLOOD PRESSURE < 80 MM HG: ICD-10-PCS | Mod: S$GLB,,, | Performed by: PHYSICIAN ASSISTANT

## 2019-12-10 PROCEDURE — 1101F PR PT FALLS ASSESS DOC 0-1 FALLS W/OUT INJ PAST YR: ICD-10-PCS | Mod: S$GLB,,, | Performed by: PHYSICIAN ASSISTANT

## 2019-12-10 PROCEDURE — 96372 THER/PROPH/DIAG INJ SC/IM: CPT | Mod: 59,S$GLB,, | Performed by: PHYSICIAN ASSISTANT

## 2019-12-10 PROCEDURE — 99214 PR OFFICE/OUTPT VISIT, EST, LEVL IV, 30-39 MIN: ICD-10-PCS | Mod: 25,S$GLB,, | Performed by: PHYSICIAN ASSISTANT

## 2019-12-10 PROCEDURE — 1125F PR PAIN SEVERITY QUANTIFIED, PAIN PRESENT: ICD-10-PCS | Mod: S$GLB,,, | Performed by: PHYSICIAN ASSISTANT

## 2019-12-10 PROCEDURE — 3078F DIAST BP <80 MM HG: CPT | Mod: S$GLB,,, | Performed by: PHYSICIAN ASSISTANT

## 2019-12-10 PROCEDURE — 1159F MED LIST DOCD IN RCRD: CPT | Mod: S$GLB,,, | Performed by: PHYSICIAN ASSISTANT

## 2019-12-10 RX ORDER — ACETAMINOPHEN AND CODEINE PHOSPHATE 300; 30 MG/1; MG/1
1 TABLET ORAL EVERY 6 HOURS PRN
Qty: 120 TABLET | Refills: 3 | Status: SHIPPED | OUTPATIENT
Start: 2019-12-10 | End: 2020-04-08 | Stop reason: SDUPTHER

## 2019-12-10 RX ORDER — FOLIC ACID 1 MG/1
1000 TABLET ORAL DAILY
Qty: 90 TABLET | Refills: 1 | Status: SHIPPED | OUTPATIENT
Start: 2019-12-10 | End: 2020-04-06 | Stop reason: SDUPTHER

## 2019-12-10 RX ORDER — METHOTREXATE 2.5 MG/1
TABLET ORAL
Qty: 32 TABLET | Refills: 6 | Status: SHIPPED | OUTPATIENT
Start: 2019-12-10 | End: 2020-04-06 | Stop reason: SDUPTHER

## 2019-12-10 RX ORDER — AZITHROMYCIN 250 MG/1
TABLET, FILM COATED ORAL
Qty: 6 TABLET | Refills: 0 | Status: SHIPPED | OUTPATIENT
Start: 2019-12-10 | End: 2020-04-06 | Stop reason: SDUPTHER

## 2019-12-10 RX ORDER — LORAZEPAM 0.5 MG/1
0.5 TABLET ORAL EVERY 12 HOURS PRN
Qty: 60 TABLET | Refills: 3 | Status: SHIPPED | OUTPATIENT
Start: 2019-12-10 | End: 2020-10-22 | Stop reason: SDUPTHER

## 2019-12-10 RX ORDER — KETOROLAC TROMETHAMINE 30 MG/ML
60 INJECTION, SOLUTION INTRAMUSCULAR; INTRAVENOUS
Status: COMPLETED | OUTPATIENT
Start: 2019-12-10 | End: 2019-12-10

## 2019-12-10 RX ORDER — METHYLPREDNISOLONE ACETATE 80 MG/ML
80 INJECTION, SUSPENSION INTRA-ARTICULAR; INTRALESIONAL; INTRAMUSCULAR; SOFT TISSUE
Status: COMPLETED | OUTPATIENT
Start: 2019-12-10 | End: 2019-12-10

## 2019-12-10 RX ADMIN — KETOROLAC TROMETHAMINE 60 MG: 30 INJECTION, SOLUTION INTRAMUSCULAR; INTRAVENOUS at 10:12

## 2019-12-10 RX ADMIN — METHYLPREDNISOLONE ACETATE 80 MG: 80 INJECTION, SUSPENSION INTRA-ARTICULAR; INTRALESIONAL; INTRAMUSCULAR; SOFT TISSUE at 10:12

## 2019-12-10 NOTE — PROGRESS NOTES
Administered 1 cc DepoMedrol 80mg/cc  to right upper outer gluteal. Pt tolerated well. No acute reaction noted to site. Pt instructed on S/S to report. Advised patient to wait in lobby 15 minutes after receiving injection to monitor for any reactions..  Pt verbalized understanding.     Lot: [id number]  Exp: 02/21  Administered 2 cc  Toradol 30mg/cc  to left upper outer gluteal. Pt tolerated well. No acute reaction noted to site. Pt instructed on S/S to report. Advised patient to wait in lobby 15 minutes after receiving injection to monitor for any reactions. Pt verbalized understanding.     Lot: [id number]  Exp: 02/2021

## 2019-12-10 NOTE — LETTER
[place] - Rheumatology  Rheumatology  [address] [place] [address]  Phone: [phone]  Fax: [phone]   December 10, 2019     Patient: Mary Reyes   YOB: 1950   Date of Visit: 12/10/2019       To Whom it May Concern:    Mary Reyes was seen in my clinic on 12/10/2019.  Please excuse her from work 12/13 and 12/15. She may return to work on 12/17/19.    Please excuse her from any classes or work missed.    If you have any questions or concerns, please don't hesitate to call.    Sincerely,         Jessica Childress PA-C

## 2019-12-15 NOTE — PROGRESS NOTES
Subjective:       Patient ID: Mary Reyes is a 69 y.o. female.    Chief Complaint: Disease Management and Psoriatic Arthritis    Mrs. Reyes is a 68 year old female who presents to clinic for follow up on psoriatic arthritis, osteoarthritis, and osteoporosis. She complains of 1 week history of maxillary sinus pressure, nasal drainage, and clear rhinorrhea that has waxed and waned. No fever, chills, or chest congestion. She complains of arthritis flare for the last week as well. She complains of increased pain in her hands, hips, and lower back.  She reports morning stiffness typically 1 hour. She also suffers with intermittent neck pain. Psoriasis is minimal on the dorsum of her feet. BP is elevated in clinic, but she states home readings are typically 130/70s. Ativan has helped significant with panic/anxiety. 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     Current treatment:  1. MTX 8 tabs weekly  2. Folic acid      Review of Systems   Constitutional: Positive for activity change. Negative for appetite change, chills, fatigue and fever.   HENT: Positive for postnasal drip, rhinorrhea, sinus pressure and sinus pain.    Eyes: Negative for visual disturbance.   Respiratory: Negative for cough and shortness of breath.    Cardiovascular: Negative for chest pain, palpitations and leg swelling.   Gastrointestinal: Negative for abdominal pain, constipation, diarrhea, nausea and vomiting.   Musculoskeletal: Positive for arthralgias, back pain, joint swelling, myalgias, neck pain and neck stiffness.   Skin: Positive for rash.   Allergic/Immunologic: Positive for immunocompromised state.   Neurological: Negative for dizziness, weakness, light-headedness and headaches.   Psychiatric/Behavioral: Positive for dysphoric mood. The patient is nervous/anxious.          Objective:     Vitals:    12/10/19 1013   BP: (!) 161/74   Pulse: 72       Past Medical History:   Diagnosis Date    Anxiety     Asthma     as a  child    General anesthetics causing adverse effect in therapeutic use     difficult to wake    GERD (gastroesophageal reflux disease)     Hyperlipidemia     Hypertension     MVP (mitral valve prolapse)     Psoriatic arthritis     Rheumatoid arthritis      Past Surgical History:   Procedure Laterality Date    HYSTERECTOMY      REVERSE TOTAL SHOULDER ARTHROPLASTY Left 9/21/2018    Procedure: ARTHROPLASTY, SHOULDER, TOTAL, REVERSE;  Surgeon: Andrea Denis MD;  [facility];  Service: Orthopedics;  Laterality: Left;    TONSILLECTOMY            Physical Exam   Constitutional: She is well-developed, well-nourished, and in no distress.   Eyes: Right conjunctiva is not injected. Left conjunctiva is not injected. Right eye exhibits normal extraocular motion. Left eye exhibits normal extraocular motion.   Neck: No JVD present. No thyromegaly present.   Cardiovascular: Normal rate and regular rhythm.  Exam reveals no decreased pulses.    Pulmonary/Chest: She has no wheezes. She has no rhonchi. She has no rales.       Right Side Rheumatological Exam     Examination finds the shoulder, elbow, wrist, 1st PIP, 1st MCP, 2nd MCP, 3rd MCP, 4th MCP and 5th MCP normal.    The patient is tender to palpation of the knee, 2nd PIP, 3rd PIP, 4th PIP, 5th PIP, 1st MTP, 2nd MTP, 3rd MTP, 4th MTP and 5th MTP    She has swelling of the knee, 2nd PIP, 3rd PIP, 4th PIP and 5th PIP    Left Side Rheumatological Exam     Examination finds the shoulder, elbow, 3rd PIP, 3rd MCP, 4th PIP, 4th MCP, 5th PIP and 5th MCP normal.    The patient is tender to palpation of the wrist, knee, 1st PIP, 1st MCP, 2nd PIP, 2nd MCP, 1st MTP, 2nd MTP, 3rd MTP, 4th MTP and 5th MTP.    She has swelling of the wrist, knee, 1st PIP, 1st MCP, 2nd PIP and 2nd MCP      Lymphadenopathy:     She has no cervical adenopathy.   Neurological: Gait normal.   Skin: Rash (scaling on the feet) noted.     Psychiatric: Mood and affect normal. She does not exhibit a  depressed mood.       Recent labs reviewed:  Component Value Ref Range Performed At Pathologist [facility]   RHEUMATOID FACTOR <15  Comment:   Perform at [facility]  [address]  [place], [place] [id number] [phone] - 30 IU/mL [facility]       RHEUMATOID FACTOR (08/16/2019 8:01 AM CDT)   Specimen   Blood     RHEUMATOID FACTOR (08/16/2019 8:01 AM CDT)   Performing Organization Address City/[facility]/Zipcode Phone Number   [facility]             Back to top of Lab Results       CYCLIC CITRULLINATED PEPTIDE AB IGG (08/16/2019 8:01 AM CDT)  CYCLIC CITRULLINATED PEPTIDE AB IGG (08/16/2019 8:01 AM CDT)   Component Value Ref Range Performed At [facility]   CYCLIC CITRULLINATED PEPTIDE AB IGG <15.6  Comment:   Test Performed by:  [facility]  [address] [place] [address] <20.0 (Negative) U [facility]       CYCLIC CITRULLINATED PEPTIDE AB IGG (08/16/2019 8:01 AM CDT)   Specimen   Blood     CYCLIC CITRULLINATED PEPTIDE AB IGG (08/16/2019 8:01 AM CDT)   Performing Organization Address City/[facility]/Zipcode Phone Number   [facility]        [phone]     [facility]             Back to top of Lab Results       SEDIMENTATION RATE (08/16/2019 8:01 AM CDT)  SEDIMENTATION RATE (08/16/2019 8:01 AM CDT)   Component Value Ref Range Performed At [facility]   ESR (SEDIMENTATION RATE) 17 <40 mm/Hr [facility]       SEDIMENTATION RATE (08/16/2019 8:01 AM CDT)   Specimen   Blood     SEDIMENTATION RATE (08/16/2019 8:01 AM CDT)   Performing Organization Address City/State/Zipcode Phone Number   [facility]   CLIA #[id number], [address] [place] [address]      Back to top of Lab Results       C-REACTIVE PROTEIN (08/16/2019 8:01 AM CDT)  C-REACTIVE PROTEIN (08/16/2019 8:01 AM CDT)   Component Value Ref Range Performed At  [facility]   CRP <5.0  Comment:     Please note new unit of measure and reference range for CRP starting June 19, 2016. <=9.0 mg/L [facility]       C-REACTIVE PROTEIN (08/16/2019 8:01 AM CDT)   Specimen   Blood     C-REACTIVE PROTEIN (08/16/2019 8:01 AM CDT)   Performing Organization Address City/State/Zipcode Phone Number   [facility]   CLIA #[id number], [address] [place] [address]      Back to top of Lab Results       COMPREHENSIVE METABOLIC PANEL (08/16/2019 8:01 AM CDT)  COMPREHENSIVE METABOLIC PANEL (08/16/2019 8:01 AM CDT)   Component Value Ref Range Performed At [facility]   SODIUM 143 135 - 146 mmol/L [facility]     POTASSIUM 4.1 3.6 - 5.2 mmol/L [facility]     CHLORIDE 105 96 - 110 mmol/L [facility]     CO2 27 24 - 32 mmol/L [facility]     CALCIUM 9.3 8.4 - 10.3 mg/dL [facility]     BUN 16 7 - 25 mg/dL [facility]     CREATININE 0.62 0.50 - 1.10 mg/dL [facility]     GLUCOSE 114 (H) 65 - 99 mg/dL [facility]     TOTAL PROTEIN 6.8 6.0 - 8.0 g/dL [facility]     ALBUMIN 4.0 3.4 - 5.0 g/dL [facility]     BILIRUBIN TOTAL 0.8 0.0 - 1.3 mg/dL [facility]     ALKALINE PHOSPHATASE 76 20 - 120 U/L [facility]     AST 20 <=45 U/L [facility]     ALT 27 <=46 U/L [facility]     GFR >60  Comment:     eGFR has not been validated for use in the elderly (> 70 years of age), pregnant women, patients with serious co-morbid conditions, or persons with extremes of body size or muscle mass and should also be interpreted with caution in patients with acute kidney failure, dialysis dependent patients, patients reporting exceptional dietary intake (e.g. vegetarian diet, high protein diets, creatine supplementation), and patients with severe liver disease.      Based on National Kidney Disease Education Program     ** If patient is , please refer to the GFR  result.       >=60 mL/min/1.73 sq meter [facility]     GFR, AFRICAN AMERICAN >60 >=60 mL/min/1.73 sq meter [facility]       COMPREHENSIVE METABOLIC PANEL (08/16/2019 8:01 AM CDT)   Specimen   Blood     COMPREHENSIVE METABOLIC PANEL (08/16/2019 8:01 AM CDT)   Performing Organization Address City/State/Zipcode Phone Number   [facility]   CLIA #[id number], [address] [place] [address]      Back to top of Lab Results       CBC WITH DIFFERENTIAL (08/16/2019 8:01 AM CDT)  CBC WITH DIFFERENTIAL (08/16/2019 8:01 AM CDT)   Component Value Ref Range Performed At [facility]   WBC 6.7 4.5 - 11.0 K/uL [facility]     RBC 4.49 4.00 - 5.20 M/uL [facility]     HEMOGLOBIN 13.4 12.0 - 16.0 g/dL [facility]     HEMATOCRIT 40.2 35.0 - 45.0 % [facility]     MCV 89.5 80.0 - 100.0 [facility]     MCH 29.8 26.0 - 34.0 pg [facility]     MCHC 33.3 31.0 - 37.0 g/dL [facility]     RDW 13.8 11.5 - 14.5 % [facility]     PLATELETS 327 130 - 400 K/uL [facility]     MPV 7.7 7.4 - 10.4 [facility]     NEUTROPHILS 40 % [facility]     LYMPHOCYTES 50 % [facility]     MONOCYTES 7 % [facility]     EOSINOPHILS 2 % [facility]     BASOPHILS 1 % [facility]     NEUTROPHIL ABSOLUTE 2.70 1.80 - 8.00 K/uL [facility]     LYMPHOCYTE ABSOLUTE 3.40 1.10 - 5.00 K/uL [facility]     MONOCYTE ABSOLUTE 0.50 0.20 - 1.10 K/uL [facility]     EOSINOPHIL ABSOLUTE 0.10 0.00 - 0.60 K/uL [facility]     BASOPHILS ABSOLUTE 0.10 0.00 - 0.20 K/uL [facility]          Assessment:       1. Psoriatic arthritis    2. Primary osteoarthritis involving multiple joints    3. Methotrexate, long term, current use    4. Fibromyalgia    5. Sinusitis, unspecified chronicity, unspecified location            Plan:       Psoriatic arthritis  -     methotrexate 2.5 MG Tab; 8 tabs PO once weekly  Dispense: 32 tablet; Refill: 6  -     folic acid (FOLVITE) 1 MG tablet; Take 1 tablet (1,000 mcg total) by mouth once daily.  Dispense: 90 tablet; Refill: 1  -     ketorolac injection 60 mg  -     methylPREDNISolone acetate injection 80 mg  -     CBC auto differential; Future; Expected date: 12/10/2019  -     Comprehensive metabolic panel; Future; Expected date: 12/10/2019  -     C-reactive protein; Future; Expected date: 12/10/2019  -     Sedimentation rate, automated; Future; Expected date: 12/10/2019    Primary osteoarthritis involving multiple joints    Methotrexate, long term, current use  -     methotrexate 2.5 MG Tab; 8 tabs PO once weekly  Dispense: 32 tablet; Refill: 6  -     folic acid (FOLVITE) 1 MG tablet; Take 1 tablet (1,000 mcg total) by mouth once daily.  Dispense: 90 tablet; Refill: 1    Fibromyalgia    Sinusitis, unspecified chronicity, unspecified location  -     azithromycin (Z-MARQUEZ) 250 MG tablet; Take 2 tablets by mouth on day 1; Take 1 tablet by mouth on days 2-5  Dispense: 6 tablet; Refill: 0        Assessment:  68 year old female with  Psoriatic arthritis, psoriasis on MTX, fibromyalgia, osteoarthritis, osteoporosis    Plan:  1. Toradol 60 mg, depomedrol 80 mg today in clinic  2. Continue MTX 8 tabs PO once weekly and folic acid 1 mg daily (Hold until infection has resolved)  3. Refill tylenol #3 sent to Dr. Asher  4. Start zpack for sinusitis    Follow up:   3 months w/labs prior

## 2020-03-13 ENCOUNTER — TELEPHONE (OUTPATIENT)
Dept: RHEUMATOLOGY | Facility: CLINIC | Age: 70
End: 2020-03-13

## 2020-03-13 NOTE — TELEPHONE ENCOUNTER
----- Message from Lisa Alcaraz sent at 3/13/2020  1:47 PM CDT -----  Type: Needs Medical Advice    Who Called:  patient  Best Call Back Number: [phone]  Additional Information: requesting a call back in regards to sending her employer a work excuse.

## 2020-03-13 NOTE — TELEPHONE ENCOUNTER
Returned patient call regarding letter for employer. Nurse informed office can not write an excuse unless she is ill. Advised to hold methotrexate. 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 Patient stated will call PCP and get a letter.

## 2020-03-13 NOTE — LETTER
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 We are recommending restriction on travel both domestically and internationally. Given, Mary Reyes 's  current medical diagnoses ,I am specifically requesting that s/he transition to remote continuation of her work.I am aware that not all employers have transitioned to date, but request s/he be availed any and all accommodations available that will allow her/him to complete their work for the foreseeable future. I am making recommendations at this time in 2 week increments and will reassess as more information is available. I at not time intend to place undue burden to the supervisors, administrators or coworkers of Mary Reyes, we are acting only out of an abundance of caution for health and safety.      Sincerely,    Ori Asher MD      [facility]   Department of Rheumatology   [phone]

## 2020-03-19 ENCOUNTER — TELEPHONE (OUTPATIENT)
Dept: RHEUMATOLOGY | Facility: CLINIC | Age: 70
End: 2020-03-19

## 2020-03-19 NOTE — TELEPHONE ENCOUNTER
----- Message from Emelia Mendosa sent at 3/19/2020  2:46 PM CDT -----  Contact: self  Type: Needs Medical Advice    Who Called:  self  Symptoms (please be specific):    How long has patient had these symptoms:    Pharmacy name and phone #:    Best Call Back Number: [phone] (home)   Additional Information: Patient is calling regarding the excuse letter that doctor was sending to patient. She has not received it in the mail yet. Please call patient if any questions. Thanks!

## 2020-03-19 NOTE — TELEPHONE ENCOUNTER
Returned patient call regarding letter. Nurse informed letter was mailed on 3-. Patient stated she still has not received it. Requested it to be resent in the mail. Nurse informed letter has been printed once signed by Dr Asher will place in the mail. Patient verbalized understanding.

## 2020-04-06 DIAGNOSIS — L40.50 PSORIATIC ARTHRITIS: ICD-10-CM

## 2020-04-06 DIAGNOSIS — Z79.631 METHOTREXATE, LONG TERM, CURRENT USE: ICD-10-CM

## 2020-04-06 DIAGNOSIS — J32.9 SINUSITIS, UNSPECIFIED CHRONICITY, UNSPECIFIED LOCATION: ICD-10-CM

## 2020-04-07 RX ORDER — FOLIC ACID 1 MG/1
1000 TABLET ORAL DAILY
Qty: 90 TABLET | Refills: 1 | Status: SHIPPED | OUTPATIENT
Start: 2020-04-07 | End: 2020-07-21 | Stop reason: SDUPTHER

## 2020-04-07 RX ORDER — AZITHROMYCIN 250 MG/1
TABLET, FILM COATED ORAL
Qty: 6 TABLET | Refills: 0 | Status: SHIPPED | OUTPATIENT
Start: 2020-04-07 | End: 2020-10-22

## 2020-04-07 RX ORDER — METHOTREXATE 2.5 MG/1
TABLET ORAL
Qty: 32 TABLET | Refills: 1 | Status: SHIPPED | OUTPATIENT
Start: 2020-04-07 | End: 2020-07-21 | Stop reason: SDUPTHER

## 2020-04-08 ENCOUNTER — PATIENT MESSAGE (OUTPATIENT)
Dept: RHEUMATOLOGY | Facility: CLINIC | Age: 70
End: 2020-04-08

## 2020-04-08 ENCOUNTER — OFFICE VISIT (OUTPATIENT)
Dept: RHEUMATOLOGY | Facility: CLINIC | Age: 70
End: 2020-04-08
Payer: MEDICARE

## 2020-04-08 DIAGNOSIS — G89.4 CHRONIC PAIN SYNDROME: ICD-10-CM

## 2020-04-08 DIAGNOSIS — Z79.631 METHOTREXATE, LONG TERM, CURRENT USE: ICD-10-CM

## 2020-04-08 DIAGNOSIS — L40.50 PSORIATIC ARTHRITIS: ICD-10-CM

## 2020-04-08 DIAGNOSIS — L40.50 PSORIATIC ARTHRITIS: Primary | ICD-10-CM

## 2020-04-08 DIAGNOSIS — M79.7 FIBROMYALGIA: ICD-10-CM

## 2020-04-08 PROCEDURE — 99442 PR PHYSICIAN TELEPHONE EVALUATION 11-20 MIN: ICD-10-PCS | Mod: 95,,, | Performed by: PHYSICIAN ASSISTANT

## 2020-04-08 PROCEDURE — 99442 PR PHYSICIAN TELEPHONE EVALUATION 11-20 MIN: CPT | Mod: 95,,, | Performed by: PHYSICIAN ASSISTANT

## 2020-04-08 NOTE — LETTER
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 We are recommending restriction on travel both domestically and internationally. Given, Mary Reyes 's  current medical diagnoses ,I am specifically requesting that she transition to remote continuation of her work.    I am aware that not all employers have transitioned to date, but request s/he be availed any and all accommodations available that will allow her to complete their work for the foreseeable future. I am making recommendations at this time in 2 week increments and will reassess as more information is available. Please excuse her from missed work assignment dating back to 3/27/2019 for above reasons.    I at not time intend to place undue burden to the supervisors, administrators or coworkers of Mary Reyes, we are acting only out of an abundance of caution for health and safety.      Sincerely,      Jessica Childress PA-C  [facility]   Department of Rheumatology   [phone]

## 2020-04-08 NOTE — Clinical Note
MAIL [employer] AND WORK EXCUSE LETTERSchedule with Dr. Asher in 3 monthSchedule with me in 6 monthsAdd to list for nurse shots when clinic opens

## 2020-04-08 NOTE — PROGRESS NOTES
The patient location is: home  The chief complaint leading to consultation is: Psoriatic arthritis  Visit type: Virtual visit with synchronous audio  Total time spent with patient: 13 minutes  Each patient to whom he or she provides medical services by telemedicine is:  (1) informed of the relationship between the physician and patient and the respective role of any other health care provider with respect to management of the patient; and (2) notified that he or she may decline to receive medical services by telemedicine and may withdraw from such care at any time.  Notes:   Subjective:       Patient ID: Mary Reyes is a 69 y.o. female.    Chief Complaint: Disease Management    Mrs. Reyes is a 68 year old female who presents to clinic for follow up on psoriatic arthritis, osteoarthritis, and osteoporosis. She complains of 2 week history of sinus pressure, nasal discharge, post-nasal drip and sore throat. She started azithromycin earlier this week. She has held MTX for the last 2-3 weeks due to concern for infection. She denies cough, sob, fever, chills, myalgia, lack of sense of smell/taste or known covid exposure. She does not feel that arthritis is well controlled on MTX, but she is hesitant to change her treatment course. She is not interested in biologic therapy. She is taking tylenol #3 as needed for severe pain with adequate control.     She underwent R knee replacement on 3/3/2020.    Current treatment:  1. MTX 8 tabs weekly  2. Folic acid  3. Tylenol #3      Review of Systems   Constitutional: Negative for activity change, appetite change, chills, fatigue and fever.   HENT: Positive for rhinorrhea, sinus pressure, sinus pain and sore throat.    Eyes: Negative for visual disturbance.   Respiratory: Negative for cough and shortness of breath.    Cardiovascular: Negative for chest pain, palpitations and leg swelling.   Gastrointestinal: Negative for abdominal pain, constipation, diarrhea, nausea and  vomiting.   Musculoskeletal: Positive for arthralgias.   Allergic/Immunologic: Positive for environmental allergies and immunocompromised state.   Neurological: Negative for dizziness, weakness, light-headedness and headaches.         Objective:     There were no vitals filed for this visit.    Past Medical History:   Diagnosis Date    Anxiety     Asthma     as a child    General anesthetics causing adverse effect in therapeutic use     difficult to wake    GERD (gastroesophageal reflux disease)     Hyperlipidemia     Hypertension     MVP (mitral valve prolapse)     Psoriatic arthritis     Rheumatoid arthritis      Past Surgical History:   Procedure Laterality Date    HYSTERECTOMY      REVERSE TOTAL SHOULDER ARTHROPLASTY Left 9/21/2018    Procedure: ARTHROPLASTY, SHOULDER, TOTAL, REVERSE;  Surgeon: Andrea Denis MD;  Location: [facility];  Service: Orthopedics;  Laterality: Left;    TONSILLECTOMY            Physical Exam   Constitutional: She is oriented to person, place, and time.   Neurological: She is alert and oriented to person, place, and time.       Recent labs reviewed:  3/2020 GFR 53, H/H 10.9, 31.8, 2/2020 AST/ALT normal  Assessment:       1. Psoriatic arthritis    2. Methotrexate, long term, current use    3. Fibromyalgia    4. Chronic pain syndrome            Plan:       Psoriatic arthritis  -     CBC auto differential; Future; Expected date: 04/08/2020  -     Comprehensive metabolic panel; Future; Expected date: 04/08/2020  -     C-Reactive Protein; Future; Expected date: 04/08/2020  -     Sedimentation rate, automated; Future; Expected date: 04/08/2020    Methotrexate, long term, current use  -     CBC auto differential; Future; Expected date: 04/08/2020  -     Comprehensive metabolic panel; Future; Expected date: 04/08/2020  -     C-Reactive Protein; Future; Expected date: 04/08/2020  -     Sedimentation rate, automated; Future; Expected date: 04/08/2020    Fibromyalgia    Chronic pain  syndrome        Assessment:  68 year old female with  Psoriatic arthritis, psoriasis on MTX  --osteoporosis  --mild renal insufficiency  --chronic pain syndrome tylenol #3  --new onset anemia, likely secondary to recent surgery    Plan:  1. Cont. zpack as prescribed. Continue to hold MTX until sx have resolved. Patient is aware that there is a COVID B-19 pandemic and that the immunosuppressants being taken to treat the arthritis but can increased the  risk for infection/Viruses.  It is understood to hold all DMARD with the exception of Plaquenil,  if having fever chills, cough, shortness of breath loss of sense of smell and or myalgias.  It is understood to call the office as well as call their primary care physician and observe  social isolation  2. Prednisone 5-10 mg prn for arthritis flare. Hold prednisone if any respiratory sx develop  3. Refill tylenol #3 sent to Dr. Asher. I have checked [place] prescription monitoring program site and no unusual or abnormal behavior has occurred pt understand the risk and benefits of taking opioid medications and has decided to continue the  medication   4. Work excuse will be mailed.  5. Ok for nurse injections in 4-6 wks if needed  6. Consider addition of otezla in the future    Follow up:   3 months w/labs prior

## 2020-04-10 RX ORDER — ACETAMINOPHEN AND CODEINE PHOSPHATE 300; 30 MG/1; MG/1
1 TABLET ORAL EVERY 6 HOURS PRN
Qty: 120 TABLET | Refills: 3 | Status: SHIPPED | OUTPATIENT
Start: 2020-04-10 | End: 2020-05-10

## 2020-04-10 RX ORDER — ACETAMINOPHEN AND CODEINE PHOSPHATE 300; 30 MG/1; MG/1
1 TABLET ORAL EVERY 6 HOURS PRN
Qty: 120 TABLET | Refills: 3 | OUTPATIENT
Start: 2020-04-10

## 2020-05-17 ENCOUNTER — PATIENT MESSAGE (OUTPATIENT)
Dept: RHEUMATOLOGY | Facility: CLINIC | Age: 70
End: 2020-05-17

## 2020-05-18 ENCOUNTER — PATIENT MESSAGE (OUTPATIENT)
Dept: RHEUMATOLOGY | Facility: CLINIC | Age: 70
End: 2020-05-18

## 2020-05-20 ENCOUNTER — PATIENT MESSAGE (OUTPATIENT)
Dept: RHEUMATOLOGY | Facility: CLINIC | Age: 70
End: 2020-05-20

## 2020-05-21 ENCOUNTER — TELEPHONE (OUTPATIENT)
Dept: RHEUMATOLOGY | Facility: CLINIC | Age: 70
End: 2020-05-21

## 2020-05-21 NOTE — TELEPHONE ENCOUNTER
Attempted to contact patient regarding scheduling a nurse visit. Left a message to contact office.

## 2020-07-20 ENCOUNTER — TELEPHONE (OUTPATIENT)
Dept: RHEUMATOLOGY | Facility: CLINIC | Age: 70
End: 2020-07-20

## 2020-07-20 NOTE — TELEPHONE ENCOUNTER
----- Message from Ros Romano sent at 7/20/2020  3:35 PM CDT -----  Regarding: return call  Contact: self  Type:  Patient Returning Call    Who Called:  self   Who Left Message for Patient:    Does the patient know what this is regarding?:    Best Call Back Number:  [phone]  Additional Information:

## 2020-07-21 ENCOUNTER — OFFICE VISIT (OUTPATIENT)
Dept: RHEUMATOLOGY | Facility: CLINIC | Age: 70
End: 2020-07-21
Payer: MEDICARE

## 2020-07-21 VITALS
HEIGHT: 65 IN | SYSTOLIC BLOOD PRESSURE: 141 MMHG | HEART RATE: 96 BPM | WEIGHT: 185 LBS | BODY MASS INDEX: 30.82 KG/M2 | DIASTOLIC BLOOD PRESSURE: 92 MMHG

## 2020-07-21 DIAGNOSIS — Z79.631 METHOTREXATE, LONG TERM, CURRENT USE: ICD-10-CM

## 2020-07-21 DIAGNOSIS — L40.50 PSORIATIC ARTHRITIS: Primary | ICD-10-CM

## 2020-07-21 DIAGNOSIS — G89.4 CHRONIC PAIN SYNDROME: ICD-10-CM

## 2020-07-21 DIAGNOSIS — F41.9 ANXIETY DISORDER, UNSPECIFIED TYPE: ICD-10-CM

## 2020-07-21 DIAGNOSIS — M79.7 FIBROMYALGIA: ICD-10-CM

## 2020-07-21 PROCEDURE — 99999 PR PBB SHADOW E&M-EST. PATIENT-LVL III: CPT | Mod: PBBFAC,,, | Performed by: INTERNAL MEDICINE

## 2020-07-21 PROCEDURE — 1101F PT FALLS ASSESS-DOCD LE1/YR: CPT | Mod: S$GLB,,, | Performed by: INTERNAL MEDICINE

## 2020-07-21 PROCEDURE — 99999 PR PBB SHADOW E&M-EST. PATIENT-LVL III: ICD-10-PCS | Mod: PBBFAC,,, | Performed by: INTERNAL MEDICINE

## 2020-07-21 PROCEDURE — 3077F SYST BP >= 140 MM HG: CPT | Mod: S$GLB,,, | Performed by: INTERNAL MEDICINE

## 2020-07-21 PROCEDURE — 99215 OFFICE O/P EST HI 40 MIN: CPT | Mod: 25,S$GLB,, | Performed by: INTERNAL MEDICINE

## 2020-07-21 PROCEDURE — 1159F PR MEDICATION LIST DOCUMENTED IN MEDICAL RECORD: ICD-10-PCS | Mod: S$GLB,,, | Performed by: INTERNAL MEDICINE

## 2020-07-21 PROCEDURE — 3008F BODY MASS INDEX DOCD: CPT | Mod: S$GLB,,, | Performed by: INTERNAL MEDICINE

## 2020-07-21 PROCEDURE — 3080F DIAST BP >= 90 MM HG: CPT | Mod: S$GLB,,, | Performed by: INTERNAL MEDICINE

## 2020-07-21 PROCEDURE — 1101F PR PT FALLS ASSESS DOC 0-1 FALLS W/OUT INJ PAST YR: ICD-10-PCS | Mod: S$GLB,,, | Performed by: INTERNAL MEDICINE

## 2020-07-21 PROCEDURE — 99215 PR OFFICE/OUTPT VISIT, EST, LEVL V, 40-54 MIN: ICD-10-PCS | Mod: 25,S$GLB,, | Performed by: INTERNAL MEDICINE

## 2020-07-21 PROCEDURE — 96372 THER/PROPH/DIAG INJ SC/IM: CPT | Mod: 59,S$GLB,, | Performed by: INTERNAL MEDICINE

## 2020-07-21 PROCEDURE — 3080F PR MOST RECENT DIASTOLIC BLOOD PRESSURE >= 90 MM HG: ICD-10-PCS | Mod: S$GLB,,, | Performed by: INTERNAL MEDICINE

## 2020-07-21 PROCEDURE — 3077F PR MOST RECENT SYSTOLIC BLOOD PRESSURE >= 140 MM HG: ICD-10-PCS | Mod: S$GLB,,, | Performed by: INTERNAL MEDICINE

## 2020-07-21 PROCEDURE — 1159F MED LIST DOCD IN RCRD: CPT | Mod: S$GLB,,, | Performed by: INTERNAL MEDICINE

## 2020-07-21 PROCEDURE — 96372 PR INJECTION,THERAP/PROPH/DIAG2ST, IM OR SUBCUT: ICD-10-PCS | Mod: 59,S$GLB,, | Performed by: INTERNAL MEDICINE

## 2020-07-21 PROCEDURE — 3008F PR BODY MASS INDEX (BMI) DOCUMENTED: ICD-10-PCS | Mod: S$GLB,,, | Performed by: INTERNAL MEDICINE

## 2020-07-21 RX ORDER — CYANOCOBALAMIN 1000 UG/ML
1000 INJECTION, SOLUTION INTRAMUSCULAR; SUBCUTANEOUS
Status: COMPLETED | OUTPATIENT
Start: 2020-07-21 | End: 2020-07-21

## 2020-07-21 RX ORDER — METHOTREXATE 2.5 MG/1
TABLET ORAL
Qty: 32 TABLET | Refills: 1 | Status: SHIPPED | OUTPATIENT
Start: 2020-07-21 | End: 2020-10-22 | Stop reason: SDUPTHER

## 2020-07-21 RX ORDER — METHYLPREDNISOLONE ACETATE 80 MG/ML
160 INJECTION, SUSPENSION INTRA-ARTICULAR; INTRALESIONAL; INTRAMUSCULAR; SOFT TISSUE
Status: COMPLETED | OUTPATIENT
Start: 2020-07-21 | End: 2020-07-21

## 2020-07-21 RX ORDER — ACETAMINOPHEN AND CODEINE PHOSPHATE 300; 30 MG/1; MG/1
1 TABLET ORAL EVERY 6 HOURS PRN
Qty: 120 TABLET | Refills: 3 | Status: SHIPPED | OUTPATIENT
Start: 2020-07-21 | End: 2020-08-20

## 2020-07-21 RX ORDER — KETOROLAC TROMETHAMINE 30 MG/ML
60 INJECTION, SOLUTION INTRAMUSCULAR; INTRAVENOUS
Status: COMPLETED | OUTPATIENT
Start: 2020-07-21 | End: 2020-07-21

## 2020-07-21 RX ORDER — FOLIC ACID 1 MG/1
1000 TABLET ORAL DAILY
Qty: 90 TABLET | Refills: 1 | Status: SHIPPED | OUTPATIENT
Start: 2020-07-21 | End: 2020-10-22 | Stop reason: SDUPTHER

## 2020-07-21 RX ADMIN — KETOROLAC TROMETHAMINE 60 MG: 30 INJECTION, SOLUTION INTRAMUSCULAR; INTRAVENOUS at 06:07

## 2020-07-21 RX ADMIN — CYANOCOBALAMIN 1000 MCG: 1000 INJECTION, SOLUTION INTRAMUSCULAR; SUBCUTANEOUS at 06:07

## 2020-07-21 RX ADMIN — METHYLPREDNISOLONE ACETATE 160 MG: 80 INJECTION, SUSPENSION INTRA-ARTICULAR; INTRALESIONAL; INTRAMUSCULAR; SOFT TISSUE at 06:07

## 2020-07-21 ASSESSMENT — ROUTINE ASSESSMENT OF PATIENT INDEX DATA (RAPID3)
FATIGUE SCORE: 2.2
MDHAQ FUNCTION SCORE: 0.8
TOTAL RAPID3 SCORE: 4.89
PSYCHOLOGICAL DISTRESS SCORE: 1.1
PAIN SCORE: 7
PATIENT GLOBAL ASSESSMENT SCORE: 5

## 2020-07-21 NOTE — PROGRESS NOTES
Administered 1 cc B12 1000mcg to right upper outer gluteal.  Pt tolerated well.No acute reaction noted to site. Pt instructed on S/S to report. Advised pt to waitn in lobby 15 minutes after receiving injection to monitor for any reactions. Pt verbalized understanding.       Administered 2 cc DepoMedrol 80mg/cc  to right upper outer gluteal. Pt tolerated well. No acute reaction noted to site. Pt instructed on S/S to report. Advised patient to wait in lobby 15 minutes after receiving injection to monitor for any reactions..  Pt verbalized understanding.     Administered 2 cc  Toradol 30mg/cc  to left upper outer gluteal. Pt tolerated well. No acute reaction noted to site. Pt instructed on S/S to report. Advised patient to wait in lobby 15 minutes after receiving injection to monitor for any reactions. Pt verbalized understanding.

## 2020-07-21 NOTE — PROGRESS NOTES
T  Subjective:       Patient ID: Mary Reyes is a 69 y.o. female.    Chief Complaint: Disease Management, Osteoarthritis (primary osteoarthritis ), and Psoriatic Arthritis    Follow up: 68 year old female psoriatic arthritis, osteoarthritis, and osteoporosis. She has been on  MTX  She denies cough, sob, fever, chills, myalgia, lack of sense of smell/taste or known covid exposure. She does not feel that arthritis is well controlled on MTX, but she is hesitant to change her treatment course. She is taking tylenol #3 as needed for severe pain with adequate control.     She underwent R knee replacement on 3/3/2020.    Current treatment:  1. MTX 8 tabs weekly  2. Folic acid  3. Tylenol #3              She complains of joint swelling. Pertinent negatives include no fatigue, fever or headaches.     Her past medical history is significant for osteoarthritis.   Osteoarthritis  Associated symptoms include arthralgias, joint swelling, neck pain and a sore throat. Pertinent negatives include no abdominal pain, chest pain, chills, coughing, fatigue, fever, headaches, nausea, vomiting or weakness.     Review of Systems   Constitutional: Negative for activity change, appetite change, chills, fatigue and fever.   HENT: Positive for rhinorrhea and sore throat. Negative for sinus pressure and sinus pain.    Eyes: Negative for visual disturbance.   Respiratory: Negative for cough and shortness of breath.    Cardiovascular: Negative for chest pain, palpitations and leg swelling.   Gastrointestinal: Negative for abdominal pain, constipation, diarrhea, nausea and vomiting.   Musculoskeletal: Positive for arthralgias, back pain, joint swelling and neck pain.   Allergic/Immunologic: Positive for environmental allergies and immunocompromised state.   Neurological: Negative for dizziness, weakness, light-headedness and headaches.         Objective:     Vitals:    07/21/20 1552   BP: (!) 141/92   Pulse: 96       Past Medical History:    Diagnosis Date    Anxiety     Asthma     as a child    General anesthetics causing adverse effect in therapeutic use     difficult to wake    GERD (gastroesophageal reflux disease)     Hyperlipidemia     Hypertension     MVP (mitral valve prolapse)     Psoriatic arthritis     Rheumatoid arthritis      Past Surgical History:   Procedure Laterality Date    HYSTERECTOMY      REVERSE TOTAL SHOULDER ARTHROPLASTY Left 9/21/2018    Procedure: ARTHROPLASTY, SHOULDER, TOTAL, REVERSE;  Surgeon: Andrea Denis MD;  [facility];  Service: Orthopedics;  Laterality: Left;    TONSILLECTOMY            Physical Exam   Vitals reviewed.  Constitutional: She is oriented to person, place, and time and well-developed, well-nourished, and in no distress.   HENT:   Head: Normocephalic and atraumatic.   Mouth/Throat: Oropharynx is clear and moist.   Eyes: EOM are normal. Pupils are equal, round, and reactive to light.   Neck: Neck supple. No thyromegaly present.   Cardiovascular: Normal rate, regular rhythm and normal heart sounds.  Exam reveals no gallop and no friction rub.    No murmur heard.  Pulmonary/Chest: Breath sounds normal. She has no wheezes. She has no rales. She exhibits no tenderness.   Abdominal: There is no abdominal tenderness. There is no rebound and no guarding.       Right Side Rheumatological Exam     Examination finds the elbow, 1st MCP, 2nd MCP, 3rd MCP, 4th MCP and 5th MCP normal.    The patient is tender to palpation of the shoulder and knee    She has swelling of the knee    The patient has an enlarged wrist, 1st PIP, 2nd PIP, 3rd PIP, 4th PIP and 5th PIP    Shoulder Exam   Tenderness Location: acromion    Range of Motion   Active abduction: abnormal   Adduction: abnormal  Sensation: normal    Knee Exam   Tenderness Location: medial joint line  Patellofemoral Crepitus: positive  Effusion: positive  Sensation: normal    Hip Exam   Tenderness Location: no tenderness  Sensation:  normal    Elbow/Wrist Exam   Tenderness Location: no tenderness  Sensation: normal    Left Side Rheumatological Exam     Examination finds the elbow, knee, 1st MCP, 2nd MCP, 3rd MCP, 4th MCP and 5th MCP normal.    The patient is tender to palpation of the shoulder.    The patient has an enlarged wrist, 1st PIP, 2nd PIP, 3rd PIP, 4th PIP and 5th PIP.    Shoulder Exam   Tenderness Location: acromion    Range of Motion   Active abduction: abnormal   Sensation: normal    Knee Exam   Tenderness Location: lateral joint line and medial joint line    Patellofemoral Crepitus: positive  Effusion: positive  Sensation: normal    Hip Exam   Tenderness Location: no tenderness  Sensation: normal    Elbow/Wrist Exam   Sensation: normal      Back/Neck Exam   General Inspection   Gait: normal       Tenderness Right paramedian tenderness of the Lower C-Spine and Lower L-Spine.Left paramedian tenderness of the Upper C-Spine and Lower L-Spine.      Lymphadenopathy:     She has no cervical adenopathy.   Neurological: She is alert and oriented to person, place, and time. She has normal sensation and normal strength. Gait normal.   Reflex Scores:       Patellar reflexes are 3+ on the right side and 3+ on the left side.  Skin: No rash noted. No erythema. No pallor.     Psychiatric: Mood and affect normal.   Musculoskeletal: Tenderness and deformity present. No edema.         Lab Results  - documented in this encounter  Table of Contents for Lab Results   T3 FREE (07/08/2020 7:38 AM CDT)   T4 FREE (07/08/2020 7:38 AM CDT)   TSH REFLEXIVE (07/08/2020 7:38 AM CDT)   T4 FREE (07/08/2020 7:38 AM CDT)   LIPID PANEL (07/08/2020 7:38 AM CDT)   COMPREHENSIVE METABOLIC PANEL (07/08/2020 7:38 AM CDT)   CBC WITH DIFFERENTIAL (07/08/2020 7:38 AM CDT)        T3 FREE (07/08/2020 7:38 AM CDT)  T3 FREE (07/08/2020 7:38 AM CDT)   Component Value Ref Range Performed At Pathologist Signature   T3 FREE 2.8 2.4 - 6.8 pg/mL [facility]       T3  FREE (07/08/2020 7:38 AM CDT)   Specimen   Blood     T3 FREE (07/08/2020 7:38 AM CDT)   Performing Organization Address City/[facility] Phone Number   [facility]   CLIA #[id number]    [address] [place] [phone]        Back to top of Lab Results       T4 FREE (07/08/2020 7:38 AM CDT)  T4 FREE (07/08/2020 7:38 AM CDT)   Component Value Ref Range Performed At Pathologist Signature   T4 FREE 0.79 0.60 - 1.15 ng/dL [facility]       T4 FREE (07/08/2020 7:38 AM CDT)   Specimen   Blood     T4 FREE (07/08/2020 7:38 AM CDT)   Performing Organization Address City/[facility] Phone Number   [facility]   CLIA #[id number]    [address] [place] [phone]        Back to top of Lab Results       TSH REFLEXIVE (07/08/2020 7:38 AM CDT)  TSH REFLEXIVE (07/08/2020 7:38 AM CDT)   Component Value Ref Range Performed At Pathologist Signature   TSH 0.47 (L) 0.50 - 5.00 uIU/mL [facility]       TSH REFLEXIVE (07/08/2020 7:38 AM CDT)   Specimen   Blood     TSH REFLEXIVE (07/08/2020 7:38 AM CDT)   Performing Organization Address City/[facility] Phone Number   [facility]   CLIA #[id number]    [address] [place] [phone]        Back to top of Lab Results       T4 FREE (07/08/2020 7:38 AM CDT)  T4 FREE (07/08/2020 7:38 AM CDT)   Component Value Ref Range Performed At Pathologist Signature   T4 FREE 0.79 0.60 - 1.15 ng/dL [facility]       T4 FREE (07/08/2020 7:38 AM CDT)   Specimen   Blood     T4 FREE (07/08/2020 7:38 AM CDT)   Performing Organization Address City/[facility] Phone Number   [facility]   CLIA #[id number]    [address] [place] [phone]        Back to top of Lab Results       LIPID PANEL (07/08/2020 7:38 AM CDT)  LIPID PANEL (07/08/2020 7:38 AM CDT)   Component Value Ref Range Performed At Pathologist Signature   CHOLESTEROL 154 <200 mg/dL [facility]      TRIGLYCERIDE 184 (H) <=150 mg/dL [facility]     HDL 42 40 - 59 mg/dL [facility]     LDL CALCULATED 75 <130 mg/dL [facility]     NON-HDL CHOLESTEROL 112 <130 mg/dL [facility]     CHOL/HDL RATIO 3.7   [facility]       LIPID PANEL (07/08/2020 7:38 AM CDT)   Specimen   Blood     LIPID PANEL (07/08/2020 7:38 AM CDT)   Narrative Performed At     Coronary Heart Disease Risk    ---------------------------------                        Male  Female    Average Risk          5.0    4.4     2 x Average Risk      9.6    7.1    3 x Average Risk     23.4   11.0   [facility]       LIPID PANEL (07/08/2020 7:38 AM CDT)   Performing Organization Address City/State/Zipcode Phone Number   [facility]   CLIA #[id number]    [address] [place] [address]        Back to top of Lab Results       COMPREHENSIVE METABOLIC PANEL (07/08/2020 7:38 AM CDT)  COMPREHENSIVE METABOLIC PANEL (07/08/2020 7:38 AM CDT)   Component Value Ref Range Performed At Pathologist Signature   SODIUM 139 135 - 146 mmol/L [facility]     POTASSIUM 4.4 3.6 - 5.2 mmol/L [facility]     CHLORIDE 105 96 - 110 mmol/L [facility]     CO2 25 24 - 32 mmol/L [facility]     CALCIUM 9.5 8.4 - 10.3 mg/dL [facility]     BUN 18 7 - 25 mg/dL [facility]     CREATININE 0.74 0.50 - 1.10 mg/dL [facility]     GLUCOSE 127 (H) 65 - 99 mg/dL [facility]     TOTAL PROTEIN 7.2 6.0 - 8.0 g/dL [facility]     ALBUMIN 4.5 3.4 - 5.0 g/dL [facility]     BILIRUBIN TOTAL 0.6 0.0 - 1.3 mg/dL [facility]     ALKALINE PHOSPHATASE 73 20 - 120 U/L [facility]     AST 24 <=45 U/L [facility]     ALT 32 <=46 U/L [facility]     GFR >60  Comment:   eGFR has not been validated  for use in the elderly (> 70 years of age), pregnant women, patients with serious co-morbid conditions, or persons with extremes of body size or muscle mass and should also be interpreted with caution in patients with acute kidney failure, dialysis dependent patients, patients reporting exceptional dietary intake (e.g. vegetarian diet, high protein diets, creatine supplementation), and patients with severe liver disease.     Based on National Kidney Disease Education Program     ** If patient is , please refer to the GFR  result.       >=60 mL/min/1.73 sq meter [facility]     GFR, AFRICAN AMERICAN >60 >=60 mL/min/1.73 sq meter [facility]       COMPREHENSIVE METABOLIC PANEL (07/08/2020 7:38 AM CDT)   Specimen   Blood     COMPREHENSIVE METABOLIC PANEL (07/08/2020 7:38 AM CDT)   Performing Organization Address City/State/Zipcode Phone Number   [facility]   CLIA #[id number]    [address] [place] [address]        Back to top of Lab Results       CBC WITH DIFFERENTIAL (07/08/2020 7:38 AM CDT)  CBC WITH DIFFERENTIAL (07/08/2020 7:38 AM CDT)   Component Value Ref Range Performed At Pathologist Signature   WBC 12.0 (H) 4.5 - 11.0 K/uL [facility]     RBC 4.48 4.00 - 5.20 M/uL [facility]     HEMOGLOBIN 13.4 12.0 - 16.0 g/dL [facility]     HEMATOCRIT 39.7 35.0 - 45.0 % [facility]     MCV 88.7 80.0 - 100.0 fL [facility]     MCH 29.9 26.0 - 34.0 pg [facility]     MCHC 33.7 31.0 - 37.0 g/dL [facility]     RDW 14.1 11.5 - 14.5 % [facility]     PLATELETS 358 130 - 400 K/uL [facility]     MPV 8.1 7.4 - 10.4 fL [facility]     NEUTROPHILS 71 % [facility]     LYMPHOCYTES 24 % [facility]     MONOCYTES 5 % [facility]     EOSINOPHILS 0 % [facility]  [facility]     BASOPHILS 0 % [facility]     NEUTROPHIL ABSOLUTE 8.50 (H) 1.80 - 8.00 K/uL [facility]     LYMPHOCYTE ABSOLUTE 2.80 1.10 - 5.00 K/uL [facility]     MONOCYTE ABSOLUTE 0.60 0.20 - 1.10 K/uL [facility]     EOSINOPHIL ABSOLUTE 0.00 0.00 - 0.60 K/uL [facility]     BASOPHILS ABSOLUTE 0.00 0.00 - 0.20 K/uL [facility]       CBC WITH DIFFERENTIAL (07/08/2020 7:38 AM CDT)   Specimen   Blood     CBC WITH DIFFERENTIAL (07/08/2020 7:38 AM CDT)   Performing Organization Address City/State/Zipcode Phone Number   [facility]   CLIA #[id number]    [address] [place] [address]            Assessment:       1. Psoriatic arthritis    2. Methotrexate, long term, current use    3. Fibromyalgia    4. Chronic pain syndrome    5. Anxiety disorder, unspecified type            Plan:       Psoriatic arthritis  -     ketorolac injection 60 mg  -     methylPREDNISolone acetate injection 160 mg  -     cyanocobalamin injection 1,000 mcg  -     Comprehensive metabolic panel; Future; Expected date: 07/21/2020  -     CBC auto differential; Future; Expected date: 07/21/2020  -     Sedimentation rate; Future; Expected date: 07/21/2020  -     C-Reactive Protein; Future; Expected date: 07/21/2020  -     methotrexate 2.5 MG Tab; 8 tabs PO once weekly  Dispense: 32 tablet; Refill: 1  -     folic acid (FOLVITE) 1 MG tablet; Take 1 tablet (1,000 mcg total) by mouth once daily.  Dispense: 90 tablet; Refill: 1    Methotrexate, long term, current use  -     ketorolac injection 60 mg  -     methylPREDNISolone acetate injection 160 mg  -     cyanocobalamin injection 1,000 mcg  -     Comprehensive metabolic panel; Future; Expected date: 07/21/2020  -     CBC auto differential; Future; Expected date: 07/21/2020  -     Sedimentation rate; Future; Expected date: 07/21/2020  -     C-Reactive Protein; Future; Expected date:  07/21/2020  -     acetaminophen-codeine 300-30mg (TYLENOL #3) 300-30 mg Tab; Take 1 tablet by mouth every 6 (six) hours as needed.  Dispense: 120 tablet; Refill: 3  -     methotrexate 2.5 MG Tab; 8 tabs PO once weekly  Dispense: 32 tablet; Refill: 1  -     folic acid (FOLVITE) 1 MG tablet; Take 1 tablet (1,000 mcg total) by mouth once daily.  Dispense: 90 tablet; Refill: 1    Fibromyalgia  -     ketorolac injection 60 mg  -     methylPREDNISolone acetate injection 160 mg  -     cyanocobalamin injection 1,000 mcg  -     Comprehensive metabolic panel; Future; Expected date: 07/21/2020  -     CBC auto differential; Future; Expected date: 07/21/2020  -     Sedimentation rate; Future; Expected date: 07/21/2020  -     C-Reactive Protein; Future; Expected date: 07/21/2020  -     acetaminophen-codeine 300-30mg (TYLENOL #3) 300-30 mg Tab; Take 1 tablet by mouth every 6 (six) hours as needed.  Dispense: 120 tablet; Refill: 3  -     methotrexate 2.5 MG Tab; 8 tabs PO once weekly  Dispense: 32 tablet; Refill: 1  -     folic acid (FOLVITE) 1 MG tablet; Take 1 tablet (1,000 mcg total) by mouth once daily.  Dispense: 90 tablet; Refill: 1    Chronic pain syndrome  -     ketorolac injection 60 mg  -     methylPREDNISolone acetate injection 160 mg  -     cyanocobalamin injection 1,000 mcg  -     Comprehensive metabolic panel; Future; Expected date: 07/21/2020  -     CBC auto differential; Future; Expected date: 07/21/2020  -     Sedimentation rate; Future; Expected date: 07/21/2020  -     C-Reactive Protein; Future; Expected date: 07/21/2020  -     acetaminophen-codeine 300-30mg (TYLENOL #3) 300-30 mg Tab; Take 1 tablet by mouth every 6 (six) hours as needed.  Dispense: 120 tablet; Refill: 3  -     methotrexate 2.5 MG Tab; 8 tabs PO once weekly  Dispense: 32 tablet; Refill: 1  -     folic acid (FOLVITE) 1 MG tablet; Take 1 tablet (1,000 mcg total) by mouth once daily.  Dispense: 90 tablet; Refill: 1    Anxiety disorder, unspecified  type  -     Comprehensive metabolic panel; Future; Expected date: 07/21/2020  -     CBC auto differential; Future; Expected date: 07/21/2020  -     Sedimentation rate; Future; Expected date: 07/21/2020  -     C-Reactive Protein; Future; Expected date: 07/21/2020  -     acetaminophen-codeine 300-30mg (TYLENOL #3) 300-30 mg Tab; Take 1 tablet by mouth every 6 (six) hours as needed.  Dispense: 120 tablet; Refill: 3  -     methotrexate 2.5 MG Tab; 8 tabs PO once weekly  Dispense: 32 tablet; Refill: 1  -     folic acid (FOLVITE) 1 MG tablet; Take 1 tablet (1,000 mcg total) by mouth once daily.  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 Continue methotrexate  2. Continue pain control  3. Chest xray an if abnormal hrct without contrast

## 2020-09-12 DIAGNOSIS — L40.50 PSORIATIC ARTHRITIS: ICD-10-CM

## 2020-09-12 DIAGNOSIS — G89.4 CHRONIC PAIN SYNDROME: Primary | ICD-10-CM

## 2020-09-16 RX ORDER — ACETAMINOPHEN AND CODEINE PHOSPHATE 300; 30 MG/1; MG/1
TABLET ORAL
Qty: 120 TABLET | Refills: 3 | Status: SHIPPED | OUTPATIENT
Start: 2020-09-16 | End: 2020-10-16

## 2020-10-22 ENCOUNTER — PATIENT MESSAGE (OUTPATIENT)
Dept: RHEUMATOLOGY | Facility: CLINIC | Age: 70
End: 2020-10-22

## 2020-10-22 ENCOUNTER — OFFICE VISIT (OUTPATIENT)
Dept: RHEUMATOLOGY | Facility: CLINIC | Age: 70
End: 2020-10-22
Payer: MEDICARE

## 2020-10-22 VITALS
SYSTOLIC BLOOD PRESSURE: 163 MMHG | BODY MASS INDEX: 31.2 KG/M2 | HEIGHT: 65 IN | HEART RATE: 75 BPM | DIASTOLIC BLOOD PRESSURE: 90 MMHG | WEIGHT: 187.25 LBS

## 2020-10-22 DIAGNOSIS — Z79.631 METHOTREXATE, LONG TERM, CURRENT USE: ICD-10-CM

## 2020-10-22 DIAGNOSIS — G89.4 CHRONIC PAIN SYNDROME: ICD-10-CM

## 2020-10-22 DIAGNOSIS — L40.50 PSORIATIC ARTHRITIS: Primary | ICD-10-CM

## 2020-10-22 DIAGNOSIS — F41.9 ANXIETY DISORDER, UNSPECIFIED TYPE: ICD-10-CM

## 2020-10-22 DIAGNOSIS — M81.0 OSTEOPOROSIS, UNSPECIFIED OSTEOPOROSIS TYPE, UNSPECIFIED PATHOLOGICAL FRACTURE PRESENCE: ICD-10-CM

## 2020-10-22 DIAGNOSIS — M79.7 FIBROMYALGIA: ICD-10-CM

## 2020-10-22 DIAGNOSIS — I10 ESSENTIAL HYPERTENSION: ICD-10-CM

## 2020-10-22 DIAGNOSIS — M15.9 PRIMARY OSTEOARTHRITIS INVOLVING MULTIPLE JOINTS: ICD-10-CM

## 2020-10-22 PROCEDURE — 1125F AMNT PAIN NOTED PAIN PRSNT: CPT | Mod: S$GLB,,, | Performed by: PHYSICIAN ASSISTANT

## 2020-10-22 PROCEDURE — 99999 PR PBB SHADOW E&M-EST. PATIENT-LVL III: CPT | Mod: PBBFAC,,, | Performed by: PHYSICIAN ASSISTANT

## 2020-10-22 PROCEDURE — 99214 PR OFFICE/OUTPT VISIT, EST, LEVL IV, 30-39 MIN: ICD-10-PCS | Mod: 25,S$GLB,, | Performed by: PHYSICIAN ASSISTANT

## 2020-10-22 PROCEDURE — 3077F PR MOST RECENT SYSTOLIC BLOOD PRESSURE >= 140 MM HG: ICD-10-PCS | Mod: S$GLB,,, | Performed by: PHYSICIAN ASSISTANT

## 2020-10-22 PROCEDURE — 96372 THER/PROPH/DIAG INJ SC/IM: CPT | Mod: S$GLB,,, | Performed by: PHYSICIAN ASSISTANT

## 2020-10-22 PROCEDURE — 3080F PR MOST RECENT DIASTOLIC BLOOD PRESSURE >= 90 MM HG: ICD-10-PCS | Mod: S$GLB,,, | Performed by: PHYSICIAN ASSISTANT

## 2020-10-22 PROCEDURE — 1101F PR PT FALLS ASSESS DOC 0-1 FALLS W/OUT INJ PAST YR: ICD-10-PCS | Mod: S$GLB,,, | Performed by: PHYSICIAN ASSISTANT

## 2020-10-22 PROCEDURE — 3077F SYST BP >= 140 MM HG: CPT | Mod: S$GLB,,, | Performed by: PHYSICIAN ASSISTANT

## 2020-10-22 PROCEDURE — 3008F PR BODY MASS INDEX (BMI) DOCUMENTED: ICD-10-PCS | Mod: S$GLB,,, | Performed by: PHYSICIAN ASSISTANT

## 2020-10-22 PROCEDURE — 1125F PR PAIN SEVERITY QUANTIFIED, PAIN PRESENT: ICD-10-PCS | Mod: S$GLB,,, | Performed by: PHYSICIAN ASSISTANT

## 2020-10-22 PROCEDURE — 99999 PR PBB SHADOW E&M-EST. PATIENT-LVL III: ICD-10-PCS | Mod: PBBFAC,,, | Performed by: PHYSICIAN ASSISTANT

## 2020-10-22 PROCEDURE — 1159F MED LIST DOCD IN RCRD: CPT | Mod: S$GLB,,, | Performed by: PHYSICIAN ASSISTANT

## 2020-10-22 PROCEDURE — 3080F DIAST BP >= 90 MM HG: CPT | Mod: S$GLB,,, | Performed by: PHYSICIAN ASSISTANT

## 2020-10-22 PROCEDURE — 1159F PR MEDICATION LIST DOCUMENTED IN MEDICAL RECORD: ICD-10-PCS | Mod: S$GLB,,, | Performed by: PHYSICIAN ASSISTANT

## 2020-10-22 PROCEDURE — 96372 PR INJECTION,THERAP/PROPH/DIAG2ST, IM OR SUBCUT: ICD-10-PCS | Mod: S$GLB,,, | Performed by: PHYSICIAN ASSISTANT

## 2020-10-22 PROCEDURE — 1101F PT FALLS ASSESS-DOCD LE1/YR: CPT | Mod: S$GLB,,, | Performed by: PHYSICIAN ASSISTANT

## 2020-10-22 PROCEDURE — 99214 OFFICE O/P EST MOD 30 MIN: CPT | Mod: 25,S$GLB,, | Performed by: PHYSICIAN ASSISTANT

## 2020-10-22 PROCEDURE — 3008F BODY MASS INDEX DOCD: CPT | Mod: S$GLB,,, | Performed by: PHYSICIAN ASSISTANT

## 2020-10-22 RX ORDER — KETOROLAC TROMETHAMINE 30 MG/ML
60 INJECTION, SOLUTION INTRAMUSCULAR; INTRAVENOUS
Status: COMPLETED | OUTPATIENT
Start: 2020-10-22 | End: 2020-10-22

## 2020-10-22 RX ORDER — HYDROCODONE BITARTRATE AND ACETAMINOPHEN 5; 325 MG/1; MG/1
5-325 TABLET ORAL 4 TIMES DAILY PRN
COMMUNITY
Start: 2020-09-07 | End: 2020-10-22

## 2020-10-22 RX ORDER — METHOTREXATE 2.5 MG/1
TABLET ORAL
Qty: 96 TABLET | Refills: 0 | Status: SHIPPED | OUTPATIENT
Start: 2020-10-22 | End: 2021-03-30 | Stop reason: SDUPTHER

## 2020-10-22 RX ORDER — METHYLPREDNISOLONE ACETATE 80 MG/ML
160 INJECTION, SUSPENSION INTRA-ARTICULAR; INTRALESIONAL; INTRAMUSCULAR; SOFT TISSUE
Status: COMPLETED | OUTPATIENT
Start: 2020-10-22 | End: 2020-10-22

## 2020-10-22 RX ORDER — OMEPRAZOLE 40 MG/1
40 CAPSULE, DELAYED RELEASE ORAL
Qty: 180 CAPSULE | Refills: 1 | Status: SHIPPED | OUTPATIENT
Start: 2020-10-22 | End: 2021-04-20 | Stop reason: SDUPTHER

## 2020-10-22 RX ORDER — LISINOPRIL 10 MG/1
10 TABLET ORAL DAILY
Qty: 90 TABLET | Refills: 1 | Status: SHIPPED | OUTPATIENT
Start: 2020-10-22 | End: 2021-06-24 | Stop reason: SDUPTHER

## 2020-10-22 RX ORDER — CYANOCOBALAMIN 1000 UG/ML
1000 INJECTION, SOLUTION INTRAMUSCULAR; SUBCUTANEOUS
Status: COMPLETED | OUTPATIENT
Start: 2020-10-22 | End: 2020-10-22

## 2020-10-22 RX ORDER — SIMVASTATIN 80 MG/1
80 TABLET, FILM COATED ORAL NIGHTLY
Qty: 90 TABLET | Refills: 1 | Status: SHIPPED | OUTPATIENT
Start: 2020-10-22 | End: 2021-06-24 | Stop reason: SDUPTHER

## 2020-10-22 RX ORDER — FOLIC ACID 1 MG/1
1000 TABLET ORAL DAILY
Qty: 90 TABLET | Refills: 1 | Status: SHIPPED | OUTPATIENT
Start: 2020-10-22 | End: 2021-06-24 | Stop reason: SDUPTHER

## 2020-10-22 RX ADMIN — KETOROLAC TROMETHAMINE 60 MG: 30 INJECTION, SOLUTION INTRAMUSCULAR; INTRAVENOUS at 10:10

## 2020-10-22 RX ADMIN — CYANOCOBALAMIN 1000 MCG: 1000 INJECTION, SOLUTION INTRAMUSCULAR; SUBCUTANEOUS at 10:10

## 2020-10-22 RX ADMIN — METHYLPREDNISOLONE ACETATE 160 MG: 80 INJECTION, SUSPENSION INTRA-ARTICULAR; INTRALESIONAL; INTRAMUSCULAR; SOFT TISSUE at 10:10

## 2020-10-22 ASSESSMENT — ROUTINE ASSESSMENT OF PATIENT INDEX DATA (RAPID3)
PAIN SCORE: 9
PATIENT GLOBAL ASSESSMENT SCORE: 8
FATIGUE SCORE: 2.2
TOTAL RAPID3 SCORE: 6.78
MDHAQ FUNCTION SCORE: 1
PSYCHOLOGICAL DISTRESS SCORE: 2.2

## 2020-10-22 NOTE — PROGRESS NOTES
Subjective:       Patient ID: Mary Reyes is a 69 y.o. female.    Chief Complaint: Disease Management    Mrs. Reyes is a 69 year old female who presents to clinic for follow up on psoriatic arthritis, osteoarthritis, and osteoporosis. She has been doing fair since her last visit. She underwent reverse shoulder and is still recovering and she has some residual numbness on the forearm and fingers. She reports increased neck and low back pain with occasional radiation of the pain down the R leg. No lower ext weakness. No severe limitations. She is not interested in muscle relaxant, PT, or pain management. She feels psoriatic arthritis is well controlled on MTX alone. She denies serious infections. She is taking tylenol #3 as needed for severe pain with adequate control and ativan bid prn anxiety without s/e. Home BP typically runs 130-140/70s.    Current treatment:  1. MTX 8 tabs weekly  2. Folic acid  3. Tylenol #3      Review of Systems   Constitutional: Negative for activity change, appetite change, chills, fatigue and fever.   Eyes: Negative for visual disturbance.   Respiratory: Negative for cough.    Cardiovascular: Negative for chest pain and palpitations.   Gastrointestinal: Negative for abdominal pain, constipation, diarrhea, nausea and vomiting.   Musculoskeletal: Positive for arthralgias.   Allergic/Immunologic: Positive for immunocompromised state. Negative for environmental allergies.   Neurological: Negative for dizziness, weakness, light-headedness and headaches.         Objective:     Vitals:    10/22/20 1005   BP: (!) 163/90   Pulse: 75       Past Medical History:   Diagnosis Date    Anxiety     Asthma     as a child    General anesthetics causing adverse effect in therapeutic use     difficult to wake    GERD (gastroesophageal reflux disease)     Hyperlipidemia     Hypertension     MVP (mitral valve prolapse)     Psoriatic arthritis     Rheumatoid arthritis      Past Surgical History:    Procedure Laterality Date    HYSTERECTOMY      REVERSE TOTAL SHOULDER ARTHROPLASTY Left 9/21/2018    Procedure: ARTHROPLASTY, SHOULDER, TOTAL, REVERSE;  Surgeon: Andrea Denis MD;  Location: [facility];  Service: Orthopedics;  Laterality: Left;    TONSILLECTOMY            Physical Exam   Constitutional: She is oriented to person, place, and time and well-developed, well-nourished, and in no distress.   Eyes: Right conjunctiva is not injected. Left conjunctiva is not injected. Right eye exhibits normal extraocular motion. Left eye exhibits normal extraocular motion.   Neck: No JVD present. Muscular tenderness present. No thyromegaly present.   Cardiovascular: Normal rate and regular rhythm.  Exam reveals no decreased pulses.    Pulmonary/Chest: She has no wheezes. She has no rhonchi. She has no rales.       Right Side Rheumatological Exam     Examination finds the shoulder, elbow, wrist, knee, 1st PIP, 1st MCP, 2nd PIP, 2nd MCP, 3rd PIP, 3rd MCP, 4th PIP, 4th MCP, 5th PIP and 5th MCP normal.    Left Side Rheumatological Exam     Examination finds the shoulder, elbow, wrist, knee, 1st PIP, 1st MCP, 2nd PIP, 2nd MCP, 3rd PIP, 3rd MCP, 4th PIP, 4th MCP, 5th PIP and 5th MCP normal.      Back/Neck Exam   Tenderness Right paramedian tenderness of the Upper C-Spine, Lower C-Spine, Upper L-Spine and Lower L-Spine.Left paramedian tenderness of the Upper C-Spine, Lower C-Spine, Lower L-Spine and Upper L-Spine.      Lymphadenopathy:     She has no cervical adenopathy.   Neurological: She is alert and oriented to person, place, and time. Gait normal.   Skin: No rash noted.     Psychiatric: Mood and affect normal.       Recent labs reviewed: None new    Assessment:       1. Psoriatic arthritis    2. Methotrexate, long term, current use    3. Fibromyalgia    4. Chronic pain syndrome    5. Anxiety disorder, unspecified type    6. Essential hypertension    7. Osteoporosis, unspecified osteoporosis type, unspecified  pathological fracture presence    8. Primary osteoarthritis involving multiple joints            Plan:       Psoriatic arthritis  -     folic acid (FOLVITE) 1 MG tablet; Take 1 tablet (1,000 mcg total) by mouth once daily.  Dispense: 90 tablet; Refill: 1  -     methotrexate 2.5 MG Tab; 8 tabs PO once weekly  Dispense: 96 tablet; Refill: 0  -     omeprazole (PRILOSEC) 40 MG capsule; Take 1 capsule (40 mg total) by mouth 2 (two) times daily before meals.  Dispense: 180 capsule; Refill: 1  -     simvastatin (ZOCOR) 80 MG tablet; Take 1 tablet (80 mg total) by mouth every evening.  Dispense: 90 tablet; Refill: 1  -     CBC auto differential; Future; Expected date: 10/22/2020  -     Comprehensive Metabolic Panel; Future; Expected date: 10/22/2020  -     C-Reactive Protein; Future; Expected date: 10/22/2020  -     Sedimentation Rate; Future; Expected date: 10/22/2020  -     cyanocobalamin injection 1,000 mcg  -     ketorolac injection 60 mg  -     methylPREDNISolone acetate injection 160 mg    Methotrexate, long term, current use  -     folic acid (FOLVITE) 1 MG tablet; Take 1 tablet (1,000 mcg total) by mouth once daily.  Dispense: 90 tablet; Refill: 1  -     methotrexate 2.5 MG Tab; 8 tabs PO once weekly  Dispense: 96 tablet; Refill: 0  -     CBC auto differential; Future; Expected date: 10/22/2020  -     Comprehensive Metabolic Panel; Future; Expected date: 10/22/2020  -     C-Reactive Protein; Future; Expected date: 10/22/2020  -     Sedimentation Rate; Future; Expected date: 10/22/2020    Fibromyalgia  -     methotrexate 2.5 MG Tab; 8 tabs PO once weekly  Dispense: 96 tablet; Refill: 0  -     omeprazole (PRILOSEC) 40 MG capsule; Take 1 capsule (40 mg total) by mouth 2 (two) times daily before meals.  Dispense: 180 capsule; Refill: 1  -     simvastatin (ZOCOR) 80 MG tablet; Take 1 tablet (80 mg total) by mouth every evening.  Dispense: 90 tablet; Refill: 1    Chronic pain syndrome  -     methotrexate 2.5 MG Tab; 8  tabs PO once weekly  Dispense: 96 tablet; Refill: 0    Anxiety disorder, unspecified type  -     methotrexate 2.5 MG Tab; 8 tabs PO once weekly  Dispense: 96 tablet; Refill: 0    Essential hypertension  -     lisinopriL 10 MG tablet; Take 1 tablet (10 mg total) by mouth once daily.  Dispense: 90 tablet; Refill: 1  -     omeprazole (PRILOSEC) 40 MG capsule; Take 1 capsule (40 mg total) by mouth 2 (two) times daily before meals.  Dispense: 180 capsule; Refill: 1  -     simvastatin (ZOCOR) 80 MG tablet; Take 1 tablet (80 mg total) by mouth every evening.  Dispense: 90 tablet; Refill: 1    Osteoporosis, unspecified osteoporosis type, unspecified pathological fracture presence  -     omeprazole (PRILOSEC) 40 MG capsule; Take 1 capsule (40 mg total) by mouth 2 (two) times daily before meals.  Dispense: 180 capsule; Refill: 1  -     simvastatin (ZOCOR) 80 MG tablet; Take 1 tablet (80 mg total) by mouth every evening.  Dispense: 90 tablet; Refill: 1    Primary osteoarthritis involving multiple joints  -     omeprazole (PRILOSEC) 40 MG capsule; Take 1 capsule (40 mg total) by mouth 2 (two) times daily before meals.  Dispense: 180 capsule; Refill: 1  -     simvastatin (ZOCOR) 80 MG tablet; Take 1 tablet (80 mg total) by mouth every evening.  Dispense: 90 tablet; Refill: 1        Assessment:  69 year old female with  Psoriatic arthritis, psoriasis on MTX  --osteoporosis  --mild renal insufficiency  --chronic pain syndrome tylenol #3  --new onset anemia, likely secondary to recent surgery  --chronic benzo use on ativan    Plan:  1. Cont. MTX 8 tabs weekly, folic acid 1 mg daily  2. toradol 60, depo 160, b12 given today for back pain  3. Cont. Tylenol 3 and ativan PRN per Dr. Asher. I have checked [place] prescription monitoring program site and no unusual or abnormal behavior has occurred pt understand the risk and benefits of taking opioid medications and has decided to continue the  medication   4. Monitor BP at home and  keep a log. Notify clinic if BP is persistently >140/90.        Follow up:   3-4 months w/labs prior

## 2020-12-15 ENCOUNTER — PATIENT MESSAGE (OUTPATIENT)
Dept: RHEUMATOLOGY | Facility: CLINIC | Age: 70
End: 2020-12-15

## 2020-12-16 NOTE — TELEPHONE ENCOUNTER
Patient messaged in asking if she should take the COVID vaccine. Let know that at this time we have no recommendation. But could change in the future.

## 2021-01-05 ENCOUNTER — PATIENT MESSAGE (OUTPATIENT)
Dept: RHEUMATOLOGY | Facility: CLINIC | Age: 71
End: 2021-01-05

## 2021-01-08 ENCOUNTER — PATIENT MESSAGE (OUTPATIENT)
Dept: RHEUMATOLOGY | Facility: CLINIC | Age: 71
End: 2021-01-08

## 2021-01-29 NOTE — TELEPHONE ENCOUNTER
Contacted patient and rescheduled appointment due to illness   PPD Reading Note  PPD read and results entered in [facility]. Result: 0 mm induration.   Interpretation: negative  If test not read within 48-72 hours of initial placement, patient advised to repeat in other arm 1-3 weeks after this test.  Allergic reaction: no

## 2021-02-22 ENCOUNTER — OFFICE VISIT (OUTPATIENT)
Dept: RHEUMATOLOGY | Facility: CLINIC | Age: 71
End: 2021-02-22
Payer: MEDICARE

## 2021-02-22 VITALS
HEIGHT: 66 IN | WEIGHT: 182 LBS | BODY MASS INDEX: 29.25 KG/M2 | DIASTOLIC BLOOD PRESSURE: 82 MMHG | SYSTOLIC BLOOD PRESSURE: 126 MMHG | HEART RATE: 82 BPM

## 2021-02-22 DIAGNOSIS — M15.9 PRIMARY OSTEOARTHRITIS INVOLVING MULTIPLE JOINTS: ICD-10-CM

## 2021-02-22 DIAGNOSIS — G89.4 CHRONIC PAIN SYNDROME: ICD-10-CM

## 2021-02-22 DIAGNOSIS — M79.7 FIBROMYALGIA: ICD-10-CM

## 2021-02-22 DIAGNOSIS — N95.1 HOT FLASH, MENOPAUSAL: ICD-10-CM

## 2021-02-22 DIAGNOSIS — F41.9 ANXIETY DISORDER, UNSPECIFIED TYPE: ICD-10-CM

## 2021-02-22 DIAGNOSIS — L40.50 PSORIATIC ARTHRITIS: Primary | ICD-10-CM

## 2021-02-22 DIAGNOSIS — Z79.631 METHOTREXATE, LONG TERM, CURRENT USE: ICD-10-CM

## 2021-02-22 PROCEDURE — 3079F PR MOST RECENT DIASTOLIC BLOOD PRESSURE 80-89 MM HG: ICD-10-PCS | Mod: S$GLB,,, | Performed by: INTERNAL MEDICINE

## 2021-02-22 PROCEDURE — 3074F PR MOST RECENT SYSTOLIC BLOOD PRESSURE < 130 MM HG: ICD-10-PCS | Mod: S$GLB,,, | Performed by: INTERNAL MEDICINE

## 2021-02-22 PROCEDURE — 1125F PR PAIN SEVERITY QUANTIFIED, PAIN PRESENT: ICD-10-PCS | Mod: S$GLB,,, | Performed by: INTERNAL MEDICINE

## 2021-02-22 PROCEDURE — 3079F DIAST BP 80-89 MM HG: CPT | Mod: S$GLB,,, | Performed by: INTERNAL MEDICINE

## 2021-02-22 PROCEDURE — 3288F FALL RISK ASSESSMENT DOCD: CPT | Mod: S$GLB,,, | Performed by: INTERNAL MEDICINE

## 2021-02-22 PROCEDURE — 3288F PR FALLS RISK ASSESSMENT DOCUMENTED: ICD-10-PCS | Mod: S$GLB,,, | Performed by: INTERNAL MEDICINE

## 2021-02-22 PROCEDURE — 99999 PR PBB SHADOW E&M-EST. PATIENT-LVL III: CPT | Mod: PBBFAC,,, | Performed by: INTERNAL MEDICINE

## 2021-02-22 PROCEDURE — 1101F PR PT FALLS ASSESS DOC 0-1 FALLS W/OUT INJ PAST YR: ICD-10-PCS | Mod: S$GLB,,, | Performed by: INTERNAL MEDICINE

## 2021-02-22 PROCEDURE — 1159F MED LIST DOCD IN RCRD: CPT | Mod: S$GLB,,, | Performed by: INTERNAL MEDICINE

## 2021-02-22 PROCEDURE — 99215 PR OFFICE/OUTPT VISIT, EST, LEVL V, 40-54 MIN: ICD-10-PCS | Mod: 25,S$GLB,, | Performed by: INTERNAL MEDICINE

## 2021-02-22 PROCEDURE — 99215 OFFICE O/P EST HI 40 MIN: CPT | Mod: 25,S$GLB,, | Performed by: INTERNAL MEDICINE

## 2021-02-22 PROCEDURE — 1101F PT FALLS ASSESS-DOCD LE1/YR: CPT | Mod: S$GLB,,, | Performed by: INTERNAL MEDICINE

## 2021-02-22 PROCEDURE — 3008F BODY MASS INDEX DOCD: CPT | Mod: S$GLB,,, | Performed by: INTERNAL MEDICINE

## 2021-02-22 PROCEDURE — 96372 PR INJECTION,THERAP/PROPH/DIAG2ST, IM OR SUBCUT: ICD-10-PCS | Mod: S$GLB,,, | Performed by: INTERNAL MEDICINE

## 2021-02-22 PROCEDURE — 96372 THER/PROPH/DIAG INJ SC/IM: CPT | Mod: S$GLB,,, | Performed by: INTERNAL MEDICINE

## 2021-02-22 PROCEDURE — 3008F PR BODY MASS INDEX (BMI) DOCUMENTED: ICD-10-PCS | Mod: S$GLB,,, | Performed by: INTERNAL MEDICINE

## 2021-02-22 PROCEDURE — 1125F AMNT PAIN NOTED PAIN PRSNT: CPT | Mod: S$GLB,,, | Performed by: INTERNAL MEDICINE

## 2021-02-22 PROCEDURE — 1159F PR MEDICATION LIST DOCUMENTED IN MEDICAL RECORD: ICD-10-PCS | Mod: S$GLB,,, | Performed by: INTERNAL MEDICINE

## 2021-02-22 PROCEDURE — 99999 PR PBB SHADOW E&M-EST. PATIENT-LVL III: ICD-10-PCS | Mod: PBBFAC,,, | Performed by: INTERNAL MEDICINE

## 2021-02-22 PROCEDURE — 3074F SYST BP LT 130 MM HG: CPT | Mod: S$GLB,,, | Performed by: INTERNAL MEDICINE

## 2021-02-22 RX ORDER — CYANOCOBALAMIN 1000 UG/ML
1000 INJECTION, SOLUTION INTRAMUSCULAR; SUBCUTANEOUS
Status: COMPLETED | OUTPATIENT
Start: 2021-02-22 | End: 2021-02-22

## 2021-02-22 RX ORDER — DOXYCYCLINE 100 MG/1
CAPSULE ORAL
COMMUNITY
Start: 2021-01-16 | End: 2021-02-28 | Stop reason: ALTCHOICE

## 2021-02-22 RX ORDER — LORAZEPAM 0.5 MG/1
0.5 TABLET ORAL EVERY 12 HOURS PRN
Qty: 60 TABLET | Refills: 3 | Status: CANCELLED | OUTPATIENT
Start: 2021-02-22 | End: 2021-03-24

## 2021-02-22 RX ORDER — LORAZEPAM 0.5 MG/1
0.5 TABLET ORAL EVERY 12 HOURS PRN
Qty: 60 TABLET | Refills: 3 | Status: SHIPPED | OUTPATIENT
Start: 2021-02-22 | End: 2021-06-24 | Stop reason: SDUPTHER

## 2021-02-22 RX ORDER — METOPROLOL TARTRATE 25 MG/1
25 TABLET, FILM COATED ORAL 2 TIMES DAILY
COMMUNITY
Start: 2020-11-26 | End: 2021-06-24 | Stop reason: SDUPTHER

## 2021-02-22 RX ORDER — ACETAMINOPHEN AND CODEINE PHOSPHATE 300; 30 MG/1; MG/1
TABLET ORAL
COMMUNITY
Start: 2020-12-03 | End: 2021-02-22 | Stop reason: SDUPTHER

## 2021-02-22 RX ORDER — KETOROLAC TROMETHAMINE 30 MG/ML
60 INJECTION, SOLUTION INTRAMUSCULAR; INTRAVENOUS
Status: COMPLETED | OUTPATIENT
Start: 2021-02-22 | End: 2021-02-22

## 2021-02-22 RX ORDER — FLUTICASONE PROPIONATE 50 MCG
2 SPRAY, SUSPENSION (ML) NASAL
COMMUNITY
Start: 2021-01-16 | End: 2021-06-24

## 2021-02-22 RX ORDER — ACETAMINOPHEN AND CODEINE PHOSPHATE 300; 30 MG/1; MG/1
1 TABLET ORAL EVERY 6 HOURS PRN
Qty: 120 TABLET | Refills: 3 | Status: SHIPPED | OUTPATIENT
Start: 2021-02-22 | End: 2021-03-24

## 2021-02-22 RX ORDER — METHYLPREDNISOLONE 4 MG/1
4 TABLET ORAL DAILY
COMMUNITY
Start: 2021-01-16 | End: 2021-06-24

## 2021-02-22 RX ORDER — METHYLPREDNISOLONE ACETATE 80 MG/ML
160 INJECTION, SUSPENSION INTRA-ARTICULAR; INTRALESIONAL; INTRAMUSCULAR; SOFT TISSUE
Status: COMPLETED | OUTPATIENT
Start: 2021-02-22 | End: 2021-02-22

## 2021-02-22 RX ADMIN — METHYLPREDNISOLONE ACETATE 160 MG: 80 INJECTION, SUSPENSION INTRA-ARTICULAR; INTRALESIONAL; INTRAMUSCULAR; SOFT TISSUE at 12:02

## 2021-02-22 RX ADMIN — CYANOCOBALAMIN 1000 MCG: 1000 INJECTION, SOLUTION INTRAMUSCULAR; SUBCUTANEOUS at 12:02

## 2021-02-22 RX ADMIN — KETOROLAC TROMETHAMINE 60 MG: 30 INJECTION, SOLUTION INTRAMUSCULAR; INTRAVENOUS at 12:02

## 2021-02-22 ASSESSMENT — ROUTINE ASSESSMENT OF PATIENT INDEX DATA (RAPID3)
PAIN SCORE: 8
TOTAL RAPID3 SCORE: 6.17
PATIENT GLOBAL ASSESSMENT SCORE: 6.5
PSYCHOLOGICAL DISTRESS SCORE: 4.4
MDHAQ FUNCTION SCORE: 1.2
FATIGUE SCORE: 1.1

## 2021-03-23 PROBLEM — R00.2 PALPITATIONS: Status: ACTIVE | Noted: 2021-03-23

## 2021-03-23 PROBLEM — I10 ESSENTIAL HYPERTENSION: Status: ACTIVE | Noted: 2021-03-23

## 2021-03-30 DIAGNOSIS — F41.9 ANXIETY DISORDER, UNSPECIFIED TYPE: ICD-10-CM

## 2021-03-30 DIAGNOSIS — Z79.631 METHOTREXATE, LONG TERM, CURRENT USE: ICD-10-CM

## 2021-03-30 DIAGNOSIS — G89.4 CHRONIC PAIN SYNDROME: ICD-10-CM

## 2021-03-30 DIAGNOSIS — M79.7 FIBROMYALGIA: ICD-10-CM

## 2021-03-30 DIAGNOSIS — L40.50 PSORIATIC ARTHRITIS: ICD-10-CM

## 2021-03-31 RX ORDER — METHOTREXATE 2.5 MG/1
TABLET ORAL
Qty: 96 TABLET | Refills: 0 | Status: SHIPPED | OUTPATIENT
Start: 2021-03-31 | End: 2021-06-24 | Stop reason: SDUPTHER

## 2021-04-20 DIAGNOSIS — M15.9 PRIMARY OSTEOARTHRITIS INVOLVING MULTIPLE JOINTS: ICD-10-CM

## 2021-04-20 DIAGNOSIS — M79.7 FIBROMYALGIA: ICD-10-CM

## 2021-04-20 DIAGNOSIS — M81.0 OSTEOPOROSIS, UNSPECIFIED OSTEOPOROSIS TYPE, UNSPECIFIED PATHOLOGICAL FRACTURE PRESENCE: ICD-10-CM

## 2021-04-20 DIAGNOSIS — L40.50 PSORIATIC ARTHRITIS: ICD-10-CM

## 2021-04-20 DIAGNOSIS — I10 ESSENTIAL HYPERTENSION: ICD-10-CM

## 2021-04-21 RX ORDER — OMEPRAZOLE 40 MG/1
40 CAPSULE, DELAYED RELEASE ORAL
Qty: 180 CAPSULE | Refills: 1 | Status: SHIPPED | OUTPATIENT
Start: 2021-04-21 | End: 2021-06-24 | Stop reason: SDUPTHER

## 2021-06-15 ENCOUNTER — DOCUMENTATION ONLY (OUTPATIENT)
Dept: RHEUMATOLOGY | Facility: CLINIC | Age: 71
End: 2021-06-15

## 2021-06-17 ENCOUNTER — DOCUMENTATION ONLY (OUTPATIENT)
Dept: RHEUMATOLOGY | Facility: CLINIC | Age: 71
End: 2021-06-17

## 2021-06-24 ENCOUNTER — OFFICE VISIT (OUTPATIENT)
Dept: RHEUMATOLOGY | Facility: CLINIC | Age: 71
End: 2021-06-24
Payer: MEDICARE

## 2021-06-24 VITALS
SYSTOLIC BLOOD PRESSURE: 150 MMHG | DIASTOLIC BLOOD PRESSURE: 90 MMHG | HEART RATE: 84 BPM | TEMPERATURE: 100 F | RESPIRATION RATE: 20 BRPM

## 2021-06-24 DIAGNOSIS — M79.7 FIBROMYALGIA: ICD-10-CM

## 2021-06-24 DIAGNOSIS — Z79.631 METHOTREXATE, LONG TERM, CURRENT USE: ICD-10-CM

## 2021-06-24 DIAGNOSIS — M15.9 PRIMARY OSTEOARTHRITIS INVOLVING MULTIPLE JOINTS: ICD-10-CM

## 2021-06-24 DIAGNOSIS — F41.9 ANXIETY DISORDER, UNSPECIFIED TYPE: ICD-10-CM

## 2021-06-24 DIAGNOSIS — G89.4 CHRONIC PAIN SYNDROME: ICD-10-CM

## 2021-06-24 DIAGNOSIS — E78.5 HYPERLIPIDEMIA, UNSPECIFIED HYPERLIPIDEMIA TYPE: ICD-10-CM

## 2021-06-24 DIAGNOSIS — L40.50 PSORIATIC ARTHRITIS: ICD-10-CM

## 2021-06-24 DIAGNOSIS — K21.9 GASTROESOPHAGEAL REFLUX DISEASE, UNSPECIFIED WHETHER ESOPHAGITIS PRESENT: ICD-10-CM

## 2021-06-24 DIAGNOSIS — I10 ESSENTIAL HYPERTENSION: ICD-10-CM

## 2021-06-24 DIAGNOSIS — N39.0 URINARY TRACT INFECTION WITHOUT HEMATURIA, SITE UNSPECIFIED: ICD-10-CM

## 2021-06-24 DIAGNOSIS — L40.50 PSORIATIC ARTHRITIS: Primary | ICD-10-CM

## 2021-06-24 DIAGNOSIS — M81.0 OSTEOPOROSIS, UNSPECIFIED OSTEOPOROSIS TYPE, UNSPECIFIED PATHOLOGICAL FRACTURE PRESENCE: ICD-10-CM

## 2021-06-24 PROCEDURE — 1125F PR PAIN SEVERITY QUANTIFIED, PAIN PRESENT: ICD-10-PCS | Mod: S$GLB,,, | Performed by: PHYSICIAN ASSISTANT

## 2021-06-24 PROCEDURE — 99214 OFFICE O/P EST MOD 30 MIN: CPT | Mod: 25,S$GLB,, | Performed by: PHYSICIAN ASSISTANT

## 2021-06-24 PROCEDURE — 1101F PT FALLS ASSESS-DOCD LE1/YR: CPT | Mod: S$GLB,,, | Performed by: PHYSICIAN ASSISTANT

## 2021-06-24 PROCEDURE — 99214 PR OFFICE/OUTPT VISIT, EST, LEVL IV, 30-39 MIN: ICD-10-PCS | Mod: 25,S$GLB,, | Performed by: PHYSICIAN ASSISTANT

## 2021-06-24 PROCEDURE — 99999 PR PBB SHADOW E&M-EST. PATIENT-LVL III: ICD-10-PCS | Mod: PBBFAC,,, | Performed by: PHYSICIAN ASSISTANT

## 2021-06-24 PROCEDURE — 3288F PR FALLS RISK ASSESSMENT DOCUMENTED: ICD-10-PCS | Mod: S$GLB,,, | Performed by: PHYSICIAN ASSISTANT

## 2021-06-24 PROCEDURE — 3288F FALL RISK ASSESSMENT DOCD: CPT | Mod: S$GLB,,, | Performed by: PHYSICIAN ASSISTANT

## 2021-06-24 PROCEDURE — 96372 THER/PROPH/DIAG INJ SC/IM: CPT | Mod: S$GLB,,, | Performed by: PHYSICIAN ASSISTANT

## 2021-06-24 PROCEDURE — 1159F MED LIST DOCD IN RCRD: CPT | Mod: S$GLB,,, | Performed by: PHYSICIAN ASSISTANT

## 2021-06-24 PROCEDURE — 96372 PR INJECTION,THERAP/PROPH/DIAG2ST, IM OR SUBCUT: ICD-10-PCS | Mod: S$GLB,,, | Performed by: PHYSICIAN ASSISTANT

## 2021-06-24 PROCEDURE — 1125F AMNT PAIN NOTED PAIN PRSNT: CPT | Mod: S$GLB,,, | Performed by: PHYSICIAN ASSISTANT

## 2021-06-24 PROCEDURE — 99999 PR PBB SHADOW E&M-EST. PATIENT-LVL III: CPT | Mod: PBBFAC,,, | Performed by: PHYSICIAN ASSISTANT

## 2021-06-24 PROCEDURE — 1159F PR MEDICATION LIST DOCUMENTED IN MEDICAL RECORD: ICD-10-PCS | Mod: S$GLB,,, | Performed by: PHYSICIAN ASSISTANT

## 2021-06-24 PROCEDURE — 1101F PR PT FALLS ASSESS DOC 0-1 FALLS W/OUT INJ PAST YR: ICD-10-PCS | Mod: S$GLB,,, | Performed by: PHYSICIAN ASSISTANT

## 2021-06-24 RX ORDER — METHYLPREDNISOLONE ACETATE 80 MG/ML
160 INJECTION, SUSPENSION INTRA-ARTICULAR; INTRALESIONAL; INTRAMUSCULAR; SOFT TISSUE
Status: COMPLETED | OUTPATIENT
Start: 2021-06-24 | End: 2021-06-24

## 2021-06-24 RX ORDER — FOLIC ACID 1 MG/1
1000 TABLET ORAL DAILY
Qty: 90 TABLET | Refills: 1 | Status: SHIPPED | OUTPATIENT
Start: 2021-06-24 | End: 2022-03-03 | Stop reason: SDUPTHER

## 2021-06-24 RX ORDER — KETOROLAC TROMETHAMINE 30 MG/ML
60 INJECTION, SOLUTION INTRAMUSCULAR; INTRAVENOUS
Status: COMPLETED | OUTPATIENT
Start: 2021-06-24 | End: 2021-06-24

## 2021-06-24 RX ORDER — OMEPRAZOLE 40 MG/1
40 CAPSULE, DELAYED RELEASE ORAL
Qty: 180 CAPSULE | Refills: 1 | Status: SHIPPED | OUTPATIENT
Start: 2021-06-24 | End: 2022-04-22 | Stop reason: SDUPTHER

## 2021-06-24 RX ORDER — SULFAMETHOXAZOLE AND TRIMETHOPRIM 800; 160 MG/1; MG/1
1 TABLET ORAL 2 TIMES DAILY
Qty: 14 TABLET | Refills: 0 | Status: SHIPPED | OUTPATIENT
Start: 2021-06-24 | End: 2021-07-01

## 2021-06-24 RX ORDER — METHOTREXATE 2.5 MG/1
25 TABLET ORAL
Qty: 120 TABLET | Refills: 1 | Status: SHIPPED | OUTPATIENT
Start: 2021-06-24 | End: 2021-09-13 | Stop reason: SDUPTHER

## 2021-06-24 RX ORDER — METHOTREXATE 2.5 MG/1
TABLET ORAL
Qty: 96 TABLET | Refills: 1 | Status: SHIPPED | OUTPATIENT
Start: 2021-06-24 | End: 2021-06-24 | Stop reason: SDUPTHER

## 2021-06-24 RX ORDER — SIMVASTATIN 80 MG/1
80 TABLET, FILM COATED ORAL NIGHTLY
Qty: 90 TABLET | Refills: 1 | Status: SHIPPED | OUTPATIENT
Start: 2021-06-24 | End: 2021-08-27 | Stop reason: SDUPTHER

## 2021-06-24 RX ORDER — CYANOCOBALAMIN 1000 UG/ML
1000 INJECTION, SOLUTION INTRAMUSCULAR; SUBCUTANEOUS
Status: COMPLETED | OUTPATIENT
Start: 2021-06-24 | End: 2021-06-24

## 2021-06-24 RX ORDER — METOPROLOL TARTRATE 25 MG/1
25 TABLET, FILM COATED ORAL 2 TIMES DAILY
Qty: 180 TABLET | Refills: 1 | Status: SHIPPED | OUTPATIENT
Start: 2021-06-24 | End: 2022-07-20 | Stop reason: CLARIF

## 2021-06-24 RX ORDER — LISINOPRIL 20 MG/1
20 TABLET ORAL DAILY
Qty: 90 TABLET | Refills: 1 | Status: SHIPPED | OUTPATIENT
Start: 2021-06-24 | End: 2022-07-20 | Stop reason: CLARIF

## 2021-06-24 RX ADMIN — KETOROLAC TROMETHAMINE 60 MG: 30 INJECTION, SOLUTION INTRAMUSCULAR; INTRAVENOUS at 01:06

## 2021-06-24 RX ADMIN — CYANOCOBALAMIN 1000 MCG: 1000 INJECTION, SOLUTION INTRAMUSCULAR; SUBCUTANEOUS at 01:06

## 2021-06-24 RX ADMIN — METHYLPREDNISOLONE ACETATE 160 MG: 80 INJECTION, SUSPENSION INTRA-ARTICULAR; INTRALESIONAL; INTRAMUSCULAR; SOFT TISSUE at 01:06

## 2021-06-24 ASSESSMENT — ROUTINE ASSESSMENT OF PATIENT INDEX DATA (RAPID3)
FATIGUE SCORE: 1.1
PATIENT GLOBAL ASSESSMENT SCORE: 7
PSYCHOLOGICAL DISTRESS SCORE: 2.2
MDHAQ FUNCTION SCORE: 0.8
PAIN SCORE: 10
TOTAL RAPID3 SCORE: 6.55

## 2021-06-28 RX ORDER — ACETAMINOPHEN AND CODEINE PHOSPHATE 300; 30 MG/1; MG/1
1 TABLET ORAL EVERY 6 HOURS PRN
Qty: 120 TABLET | Refills: 3 | Status: SHIPPED | OUTPATIENT
Start: 2021-06-28 | End: 2022-07-20 | Stop reason: CLARIF

## 2021-06-28 RX ORDER — LORAZEPAM 0.5 MG/1
0.5 TABLET ORAL EVERY 12 HOURS PRN
Qty: 60 TABLET | Refills: 3 | Status: SHIPPED | OUTPATIENT
Start: 2021-06-28 | End: 2021-11-14

## 2021-08-27 DIAGNOSIS — E78.5 HYPERLIPIDEMIA, UNSPECIFIED HYPERLIPIDEMIA TYPE: ICD-10-CM

## 2021-08-27 RX ORDER — SIMVASTATIN 80 MG/1
80 TABLET, FILM COATED ORAL NIGHTLY
Qty: 90 TABLET | Refills: 1 | Status: SHIPPED | OUTPATIENT
Start: 2021-08-27 | End: 2022-07-20 | Stop reason: CLARIF

## 2021-09-13 ENCOUNTER — PATIENT MESSAGE (OUTPATIENT)
Dept: RHEUMATOLOGY | Facility: CLINIC | Age: 71
End: 2021-09-13

## 2021-09-13 DIAGNOSIS — L40.50 PSORIATIC ARTHRITIS: ICD-10-CM

## 2021-09-13 RX ORDER — METHOTREXATE 2.5 MG/1
TABLET ORAL
Qty: 96 TABLET | Refills: 0 | Status: SHIPPED | OUTPATIENT
Start: 2021-09-13 | End: 2022-07-20 | Stop reason: CLARIF

## 2021-10-16 LAB
ALBUMIN SERPL-MCNC: 4.5 G/DL (ref 3.8–4.8)
ALBUMIN/GLOB SERPL: 2 {RATIO} (ref 1.2–2.2)
ALP SERPL-CCNC: 74 IU/L (ref 44–121)
ALT SERPL-CCNC: 15 IU/L (ref 0–32)
AST SERPL-CCNC: 16 IU/L (ref 0–40)
BASOPHILS # BLD AUTO: 0.1 X10E3/UL (ref 0–0.2)
BASOPHILS NFR BLD AUTO: 1 %
BILIRUB SERPL-MCNC: 0.5 MG/DL (ref 0–1.2)
BUN SERPL-MCNC: 11 MG/DL (ref 8–27)
BUN/CREAT SERPL: 19 (ref 12–28)
CALCIUM SERPL-MCNC: 9.9 MG/DL (ref 8.7–10.3)
CHLORIDE SERPL-SCNC: 106 MMOL/L (ref 96–106)
CO2 SERPL-SCNC: 19 MMOL/L (ref 20–29)
CREAT SERPL-MCNC: 0.57 MG/DL (ref 0.57–1)
CRP SERPL-MCNC: <1 MG/L (ref 0–10)
EOSINOPHIL # BLD AUTO: 0.2 X10E3/UL (ref 0–0.4)
EOSINOPHIL NFR BLD AUTO: 3 %
ERYTHROCYTE [DISTWIDTH] IN BLOOD BY AUTOMATED COUNT: 12.6 % (ref 11.7–15.4)
ERYTHROCYTE [SEDIMENTATION RATE] IN BLOOD BY WESTERGREN METHOD: 6 MM/HR (ref 0–40)
GLOBULIN SER CALC-MCNC: 2.3 G/DL (ref 1.5–4.5)
GLUCOSE SERPL-MCNC: 236 MG/DL (ref 65–99)
HCT VFR BLD AUTO: 43.8 % (ref 34–46.6)
HGB BLD-MCNC: 14 G/DL (ref 11.1–15.9)
IMM GRANULOCYTES # BLD AUTO: 0 X10E3/UL (ref 0–0.1)
IMM GRANULOCYTES NFR BLD AUTO: 0 %
LYMPHOCYTES # BLD AUTO: 3.2 X10E3/UL (ref 0.7–3.1)
LYMPHOCYTES NFR BLD AUTO: 41 %
MCH RBC QN AUTO: 29.5 PG (ref 26.6–33)
MCHC RBC AUTO-ENTMCNC: 32 G/DL (ref 31.5–35.7)
MCV RBC AUTO: 92 FL (ref 79–97)
MONOCYTES # BLD AUTO: 0.5 X10E3/UL (ref 0.1–0.9)
MONOCYTES NFR BLD AUTO: 7 %
NEUTROPHILS # BLD AUTO: 3.9 X10E3/UL (ref 1.4–7)
NEUTROPHILS NFR BLD AUTO: 48 %
PLATELET # BLD AUTO: 301 X10E3/UL (ref 150–450)
POTASSIUM SERPL-SCNC: 4.3 MMOL/L (ref 3.5–5.2)
PROT SERPL-MCNC: 6.8 G/DL (ref 6–8.5)
RBC # BLD AUTO: 4.75 X10E6/UL (ref 3.77–5.28)
SODIUM SERPL-SCNC: 142 MMOL/L (ref 134–144)
WBC # BLD AUTO: 7.9 X10E3/UL (ref 3.4–10.8)

## 2021-11-09 DIAGNOSIS — F41.9 ANXIETY DISORDER, UNSPECIFIED TYPE: ICD-10-CM

## 2021-11-14 RX ORDER — LORAZEPAM 0.5 MG/1
TABLET ORAL
Qty: 60 TABLET | Refills: 3 | Status: ON HOLD | OUTPATIENT
Start: 2021-11-14 | End: 2022-05-21 | Stop reason: HOSPADM

## 2022-01-06 ENCOUNTER — PATIENT MESSAGE (OUTPATIENT)
Dept: ADMINISTRATIVE | Facility: OTHER | Age: 72
End: 2022-01-06
Payer: MEDICARE

## 2022-01-13 ENCOUNTER — TELEPHONE (OUTPATIENT)
Dept: SURGERY | Facility: CLINIC | Age: 72
End: 2022-01-13
Payer: MEDICARE

## 2022-01-13 DIAGNOSIS — C25.9 PANCREATIC ADENOCARCINOMA: Primary | ICD-10-CM

## 2022-01-13 PROBLEM — C25.0 MALIGNANT NEOPLASM OF HEAD OF PANCREAS: Status: ACTIVE | Noted: 2022-01-13

## 2022-01-13 PROBLEM — Z79.899 HIGH RISK MEDICATION USE: Status: ACTIVE | Noted: 2022-01-13

## 2022-01-13 PROBLEM — R94.2 ABNORMAL PET SCAN OF LUNG: Status: ACTIVE | Noted: 2022-01-13

## 2022-01-18 ENCOUNTER — TELEPHONE (OUTPATIENT)
Dept: SURGERY | Facility: CLINIC | Age: 72
End: 2022-01-18
Payer: MEDICARE

## 2022-01-19 ENCOUNTER — DOCUMENTATION ONLY (OUTPATIENT)
Dept: HEMATOLOGY/ONCOLOGY | Facility: CLINIC | Age: 72
End: 2022-01-19
Payer: MEDICARE

## 2022-01-27 ENCOUNTER — TELEPHONE (OUTPATIENT)
Dept: SURGERY | Facility: CLINIC | Age: 72
End: 2022-01-27
Payer: MEDICARE

## 2022-03-03 DIAGNOSIS — L40.50 PSORIATIC ARTHRITIS: ICD-10-CM

## 2022-03-03 RX ORDER — FOLIC ACID 1 MG/1
1000 TABLET ORAL DAILY
Qty: 90 TABLET | Refills: 1 | Status: SHIPPED | OUTPATIENT
Start: 2022-03-03 | End: 2022-07-20 | Stop reason: CLARIF

## 2022-03-03 NOTE — TELEPHONE ENCOUNTER
Pharmacy requesting refill on Folic Acid 1mg  Pt's LOV 06/24/2021  Pt's NOV 05/11/2022  Medication pending

## 2022-03-10 ENCOUNTER — PATIENT MESSAGE (OUTPATIENT)
Dept: RHEUMATOLOGY | Facility: CLINIC | Age: 72
End: 2022-03-10
Payer: MEDICARE

## 2022-04-22 DIAGNOSIS — K21.9 GASTROESOPHAGEAL REFLUX DISEASE, UNSPECIFIED WHETHER ESOPHAGITIS PRESENT: ICD-10-CM

## 2022-04-22 RX ORDER — OMEPRAZOLE 40 MG/1
40 CAPSULE, DELAYED RELEASE ORAL
Qty: 180 CAPSULE | Refills: 1 | Status: SHIPPED | OUTPATIENT
Start: 2022-04-22 | End: 2022-07-20 | Stop reason: CLARIF

## 2022-05-18 PROBLEM — R31.9 HEMATURIA: Status: ACTIVE | Noted: 2022-05-18

## 2022-05-18 PROBLEM — J90 BILATERAL PLEURAL EFFUSION: Status: ACTIVE | Noted: 2022-05-18

## 2022-05-19 PROBLEM — E43 SEVERE MALNUTRITION: Status: ACTIVE | Noted: 2022-05-19

## 2022-06-10 NOTE — TELEPHONE ENCOUNTER
[facility] Initial Follow Up Call    Outreach made within 2 business days of discharge: Yes    Patient: Eduarda Pulido Patient : 1968   MRN: [id number]  Reason for Admission: There are no discharge diagnoses documented for the most recent discharge. Discharge Date: 22       Spoke with: Wilbert Dalton \"pt returned call\"    Discharge department/facility: [address] [place] [address] Interactive Patient Contact:  Was patient able to fill all prescriptions: Yes  Was patient instructed to bring all medications to the follow-up visit: Yes  Is patient taking all medications as directed in the discharge summary?  Yes  Does patient understand their discharge instructions: Yes  Does patient have questions or concerns that need addressed prior to 7-14 day follow up office visit: no    Scheduled appointment with PCP within 7-14 days    Follow Up  Future Appointments   Date Time Provider Marilynn Samson   2022  1:20 PM MD BREANNA Ludwig  [facility] [place]   2022 10:00 AM DO BREANNA Moore  [facility] [place], [address] [place] [address] Spoke to pt, she has been out due to fall and multiple surgeries. Scheduled with Jessica Childress PA-C.

## 2022-07-20 PROBLEM — Z71.89 ACP (ADVANCE CARE PLANNING): Status: ACTIVE | Noted: 2022-07-20

## 2022-07-20 PROBLEM — D61.818 PANCYTOPENIA: Status: ACTIVE | Noted: 2022-07-20

## 2022-07-20 PROBLEM — R06.02 SOB (SHORTNESS OF BREATH): Status: ACTIVE | Noted: 2022-07-20

## 2022-07-20 PROBLEM — L03.90 CELLULITIS: Status: ACTIVE | Noted: 2022-07-20

## 2022-07-20 PROBLEM — A41.9 SEPSIS: Status: ACTIVE | Noted: 2022-07-20

## 2022-07-20 PROBLEM — E87.1 HYPONATREMIA: Status: ACTIVE | Noted: 2022-07-20

## 2022-07-22 PROBLEM — J86.9 EMPYEMA LUNG: Status: ACTIVE | Noted: 2022-07-22

## 2022-07-22 PROBLEM — R78.81 BACTEREMIA: Status: ACTIVE | Noted: 2022-07-22

## 2022-07-26 PROBLEM — R53.81 DEBILITY: Status: ACTIVE | Noted: 2022-07-26

## 2022-07-30 PROBLEM — F41.9 ANXIETY: Status: ACTIVE | Noted: 2022-07-30

## 2022-08-03 ENCOUNTER — TELEPHONE (OUTPATIENT)
Dept: PALLIATIVE MEDICINE | Facility: CLINIC | Age: 72
End: 2022-08-03
Payer: MEDICARE

## 2022-08-03 NOTE — TELEPHONE ENCOUNTER
"Left message in regards to referral and to schedule appointment   08/12/2022 per patient "now under hospice services"  "

## 2022-08-23 RX ORDER — METOPROLOL SUCCINATE 25 MG/1
25 TABLET, EXTENDED RELEASE ORAL 2 TIMES DAILY
Qty: 180 TABLET | Refills: 3 | Status: CANCELLED | OUTPATIENT
Start: 2022-08-23

## 2022-08-23 NOTE — TELEPHONE ENCOUNTER
Pharmacy requesting refill on Metoprolol 25mg  Pt's LOV 06/24/2021  Pt's NOV none scheduled  Medication pending

## 2022-08-23 NOTE — TELEPHONE ENCOUNTER
Please send request to PCP. We have not seen in over a year. She is in hospice for pancreatic cancer.

## 2022-08-24 ENCOUNTER — TELEPHONE (OUTPATIENT)
Dept: INFECTIOUS DISEASES | Facility: CLINIC | Age: 72
End: 2022-08-24
Payer: MEDICARE

## 2022-08-24 NOTE — TELEPHONE ENCOUNTER
Placed call to see if patient was coming to her appt tomorrow, as there was no confirmation, and Mr Reyes answered and stated that Mrs Hernandez   night. I offered my condolences and told him that we would be praying for him and his family.

## 2024-02-18 NOTE — PROGRESS NOTES
[facility]    Upper GI Tumor Board Note      Date: 01/19/2022  MRN: [id number]  Site: Adenocarcinoma pancreas   Presenter: Dr. Rehman    Summary: EUS and Bronch done; biopsy confirmed adenocarcinoma. Guardant testing done, genetic tests pending. Plan to start Abraxane 01/19/22. Extensive lung disease on PET. 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       Recommendations: Palliative care, FOLFIRINOX    Consult: Palliative Care     Stage: IV    Metastatic site(s): Pulmonary     [place] Treatment Guidelines reviewed and care plan is consistent with guidelines, i.e., NCCN, NCL, PDQ, ASCO, AUA, etc.    Presentation at Cancer Conference: Prospective    Discussed possible entry into Clinical Trial: No  Physician Name: Stefani Golden NP     Patient's anemia is currently controlled.  Etiology likely d/t chronic disease due to Chronic Kidney Disease/ESRD  Current CBC reviewed-   Lab Results   Component Value Date    HGB 7.1 (L) 02/16/2024    HCT 22.8 (L) 02/16/2024     Monitor serial CBC and transfuse if patient becomes hemodynamically unstable, symptomatic or H/H drops below 7/21.  - EPO started by Nephrology   Pt with bleeding from catheter site overnight after attempted removal resulting blood loss. IR consulted this am and stitched site with control of bleeding. Pending vascular eval for removal. Hgb back down to 6.9. 1 unit prbc ordered. Eliquis held.  -hgb stable for now will continue to monitor